# Patient Record
Sex: MALE | Race: BLACK OR AFRICAN AMERICAN | NOT HISPANIC OR LATINO | Employment: OTHER | URBAN - METROPOLITAN AREA
[De-identification: names, ages, dates, MRNs, and addresses within clinical notes are randomized per-mention and may not be internally consistent; named-entity substitution may affect disease eponyms.]

---

## 2022-11-08 ENCOUNTER — TELEPHONE (OUTPATIENT)
Dept: SURGERY | Facility: CLINIC | Age: 57
End: 2022-11-08

## 2022-11-08 ENCOUNTER — PREP FOR SURGERY (OUTPATIENT)
Dept: OTHER | Facility: HOSPITAL | Age: 57
End: 2022-11-08

## 2022-11-08 ENCOUNTER — OFFICE VISIT (OUTPATIENT)
Dept: SURGERY | Facility: CLINIC | Age: 57
End: 2022-11-08

## 2022-11-08 VITALS — HEIGHT: 71 IN | BODY MASS INDEX: 39.45 KG/M2 | WEIGHT: 281.8 LBS | RESPIRATION RATE: 14 BRPM

## 2022-11-08 DIAGNOSIS — K22.710 BARRETT'S ESOPHAGUS WITH LOW GRADE DYSPLASIA: Primary | ICD-10-CM

## 2022-11-08 PROCEDURE — 99203 OFFICE O/P NEW LOW 30 MIN: CPT | Performed by: SURGERY

## 2022-11-08 RX ORDER — FLUTICASONE PROPIONATE 50 MCG
SPRAY, SUSPENSION (ML) NASAL
COMMUNITY

## 2022-11-08 RX ORDER — PROPRANOLOL HYDROCHLORIDE 40 MG/1
TABLET ORAL
COMMUNITY

## 2022-11-08 RX ORDER — OMEPRAZOLE 20 MG/1
CAPSULE, DELAYED RELEASE ORAL
COMMUNITY

## 2022-11-08 RX ORDER — CHLORAL HYDRATE 500 MG
CAPSULE ORAL
COMMUNITY

## 2022-11-08 RX ORDER — ASPIRIN 81 MG/1
TABLET ORAL
COMMUNITY

## 2022-11-08 RX ORDER — BUPROPION HYDROCHLORIDE 150 MG/1
TABLET, EXTENDED RELEASE ORAL
COMMUNITY

## 2022-11-08 RX ORDER — CARBOXYMETHYLCELLULOSE SODIUM 5 MG/ML
SOLUTION/ DROPS OPHTHALMIC
COMMUNITY

## 2022-11-08 RX ORDER — PRAVASTATIN SODIUM 20 MG
TABLET ORAL
COMMUNITY

## 2022-11-08 RX ORDER — ANASTROZOLE 1 MG/1
TABLET ORAL
COMMUNITY

## 2022-11-08 RX ORDER — CYCLOBENZAPRINE HCL 10 MG
TABLET ORAL
COMMUNITY

## 2022-11-08 RX ORDER — MELOXICAM 15 MG/1
15 TABLET ORAL DAILY
COMMUNITY

## 2022-11-08 RX ORDER — TADALAFIL 5 MG/1
TABLET ORAL
COMMUNITY

## 2022-11-08 RX ORDER — CETIRIZINE HYDROCHLORIDE 10 MG/1
TABLET ORAL
COMMUNITY

## 2022-11-08 RX ORDER — FERROUS SULFATE 325(65) MG
TABLET ORAL
COMMUNITY

## 2022-11-08 RX ORDER — SILDENAFIL 100 MG/1
TABLET, FILM COATED ORAL
COMMUNITY

## 2022-11-08 RX ORDER — LISINOPRIL 20 MG/1
TABLET ORAL
COMMUNITY

## 2022-11-08 NOTE — TELEPHONE ENCOUNTER
Patient scheduled for EGD on 11/14/22.  He said to tell Dr. Ceja that he is on a bipap for sleep apnea.

## 2022-11-10 NOTE — H&P (VIEW-ONLY)
General Surgery/Colorectal Surgery Note    Patient Name:  Augustine Zaragoza  YOB: 1965  [id number]    Referring Provider: Heaven Carvalho APRN      Patient Care Team:  Provider, No Known as PCP - Uriah Sellers MD as Consulting Physician (General Surgery)    Chief complaint Barker's esophagus    Subjective .     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  He is currently taking a PPI.  His reflux symptoms are controlled.  No tobacco or significant alcohol use.  No blood thinner use.  Colonoscopy up-to-date.  Comes in to discuss surveillance upper endoscopy.  Denies any treatment for his low-grade dysplasia      History:  History reviewed. No pertinent past medical history.    History reviewed. No pertinent surgical history.    History reviewed. No pertinent family history.    Social History     Tobacco Use   • Smoking status: Never   • Smokeless tobacco: Never   Vaping Use   • Vaping Use: Never used       Review of Systems  All systems were reviewed and negative except for:   Review of Systems   Constitutional: Negative for chills, fever and unexpected weight loss.   HENT: Negative for congestion, nosebleeds and voice change.    Eyes: Negative for blurred vision, double vision and discharge.   Respiratory: Negative for apnea, chest tightness and shortness of breath.    Cardiovascular: Negative for chest pain and leg swelling.   Gastrointestinal:        See HPI   Endocrine: Negative for cold intolerance and heat intolerance.   Genitourinary: Negative for dysuria, hematuria and urgency.   Musculoskeletal: Negative for back pain, joint swelling and neck pain.   Skin: Negative for color change and dry skin.   Neurological: Negative for dizziness and confusion.   Hematological: Negative for adenopathy.   Psychiatric/Behavioral: Negative for agitation and behavioral problems.     MEDS:  Prior to Admission medications    Medication Sig  Start Date End Date Taking? Authorizing Provider   anastrozole (ARIMIDEX) 1 MG tablet anastrozole 1 mg tablet   Yes Provider, MD Sandra   ascorbic acid (VITAMIN C) 1000 MG tablet Daily.   Yes Provider, MD Sandra   aspirin 81 MG EC tablet aspirin 81 mg tablet,delayed release   Yes Provider, MD Sandra   buPROPion SR (WELLBUTRIN SR) 150 MG 12 hr tablet bupropion HCl  mg tablet,12 hr sustained-release   Yes Provider, MD Sandra   carboxymethylcellulose (REFRESH PLUS) 0.5 % solution Refresh Tears 0.5 % eye drops   Yes Provider, MD Sandra   cetirizine (zyrTEC) 10 MG tablet cetirizine 10 mg tablet   Yes Provider, Historical, MD   cyclobenzaprine (FLEXERIL) 10 MG tablet 1 tablet   Yes Provider, MD Sandra   Diclofenac Sodium (VOLTAREN) 1 % gel gel Voltaren 1 % topical gel   Yes Provider, Historical, MD   ferrous sulfate 325 (65 FE) MG tablet ferrous sulfate 325 mg (65 mg iron) tablet   Yes Provider, MD Sandra   fluticasone (FLONASE) 50 MCG/ACT nasal spray fluticasone propionate 50 mcg/actuation nasal spray,suspension   Yes Provider, Historical, MD   lisinopril (PRINIVIL,ZESTRIL) 20 MG tablet lisinopril 20 mg tablet   Yes Provider, Historical, MD   meloxicam (MOBIC) 15 MG tablet Take 1 tablet by mouth Daily.   Yes Provider, MD Sandra   Omega-3 Fatty Acids (fish oil) 1000 MG capsule capsule Fish Oil 340 mg-1,000 mg capsule   Yes Provider, Historical, MD   omeprazole (priLOSEC) 20 MG capsule omeprazole 20 mg capsule,delayed release   Yes Provider, MD Sandra   pravastatin (PRAVACHOL) 20 MG tablet pravastatin 20 mg tablet   Yes Provider, Historical, MD   propranolol (INDERAL) 40 MG tablet propranolol 40 mg tablet   Yes Provider, Historical, MD   sildenafil (VIAGRA) 100 MG tablet sildenafil 100 mg tablet   Yes Provider, Historical, MD   tadalafil (CIALIS) 5 MG tablet Cialis 5 mg tablet   Yes Provider, Historical, MD        Allergies:  Oxycodone-acetaminophen    Objective     Vital  "Signs        Physical Exam:     General Appearance:    Alert, cooperative, in no acute distress   Head:    Normocephalic, without obvious abnormality, atraumatic   Eyes:          Conjunctivae and sclerae normal, no icterus,     Ears:    Ears appear intact with no abnormalities noted   Throat:   No oral lesions, no thrush, oral mucosa moist   Neck:   No adenopathy, supple, trachea midline, no thyromegaly   Back:     No kyphosis present, no scoliosis present, no skin lesions,      erythema or scars, no tenderness to percussion or                   palpation,   range of motion normal   Lungs:     Clear to auscultation,respirations regular, even and                  unlabored    Heart:    Regular rhythm and normal rate, normal S1 and S2, no            murmur, no gallop, no rub, no click   Chest Wall:    No abnormalities observed   Abdomen:     Normal bowel sounds, no masses, no organomegaly, soft        non-tender, non-distended, no guarding, no rebound                tenderness   Rectal:        Extremities:   Moves all extremities well, no edema, no cyanosis, no             redness   Pulses:   Pulses palpable and equal bilaterally   Skin:   No bleeding, bruising or rash   Lymph nodes:   No palpable adenopathy   Neurologic:   A/o x 4 with no deficits       Results Review:   {Results Review:16089::\"I reviewed the patient's new clinical results.\"    LABS/IMAGING:  No results found for this or any previous visit.     Result Review :     Assessment & Plan     Barker's esophagus with low-grade dysplasia    I recommended proceeding with upper endoscopy with biopsies.  Benefits and alternatives discussed.  Risk procedure including bleeding and perforation discussed.  We will obtain his last EGD and pathology results from Dr. Cunningham in [facility].  If patient has low-grade dysplasia will recommend referral for radiofrequency ablation.  All questions answered.  He agrees with the plan.  Thank for the consult.      "         This document has been electronically signed by Uriah Ceja MD  November 10, 2022 08:53 EST

## 2022-11-10 NOTE — PROGRESS NOTES
General Surgery/Colorectal Surgery Note    Patient Name:  Augustine Zaragoza  YOB: 1965  [id number]    Referring Provider: Heaven Carvalho APRN      Patient Care Team:  Provider, No Known as PCP - Uriah Sellers MD as Consulting Physician (General Surgery)    Chief complaint Barker's esophagus    Subjective .     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  He is currently taking a PPI.  His reflux symptoms are controlled.  No tobacco or significant alcohol use.  No blood thinner use.  Colonoscopy up-to-date.  Comes in to discuss surveillance upper endoscopy.  Denies any treatment for his low-grade dysplasia      History:  History reviewed. No pertinent past medical history.    History reviewed. No pertinent surgical history.    History reviewed. No pertinent family history.    Social History     Tobacco Use   • Smoking status: Never   • Smokeless tobacco: Never   Vaping Use   • Vaping Use: Never used       Review of Systems  All systems were reviewed and negative except for:   Review of Systems   Constitutional: Negative for chills, fever and unexpected weight loss.   HENT: Negative for congestion, nosebleeds and voice change.    Eyes: Negative for blurred vision, double vision and discharge.   Respiratory: Negative for apnea, chest tightness and shortness of breath.    Cardiovascular: Negative for chest pain and leg swelling.   Gastrointestinal:        See HPI   Endocrine: Negative for cold intolerance and heat intolerance.   Genitourinary: Negative for dysuria, hematuria and urgency.   Musculoskeletal: Negative for back pain, joint swelling and neck pain.   Skin: Negative for color change and dry skin.   Neurological: Negative for dizziness and confusion.   Hematological: Negative for adenopathy.   Psychiatric/Behavioral: Negative for agitation and behavioral problems.     MEDS:  Prior to Admission medications    Medication Sig  Start Date End Date Taking? Authorizing Provider   anastrozole (ARIMIDEX) 1 MG tablet anastrozole 1 mg tablet   Yes Provider, MD Sandra   ascorbic acid (VITAMIN C) 1000 MG tablet Daily.   Yes Provider, MD Sandra   aspirin 81 MG EC tablet aspirin 81 mg tablet,delayed release   Yes Provider, MD Sandra   buPROPion SR (WELLBUTRIN SR) 150 MG 12 hr tablet bupropion HCl  mg tablet,12 hr sustained-release   Yes Provider, MD Sandar   carboxymethylcellulose (REFRESH PLUS) 0.5 % solution Refresh Tears 0.5 % eye drops   Yes Provider, MD Sandra   cetirizine (zyrTEC) 10 MG tablet cetirizine 10 mg tablet   Yes Provider, Historical, MD   cyclobenzaprine (FLEXERIL) 10 MG tablet 1 tablet   Yes Provider, MD Sandra   Diclofenac Sodium (VOLTAREN) 1 % gel gel Voltaren 1 % topical gel   Yes Provider, Historical, MD   ferrous sulfate 325 (65 FE) MG tablet ferrous sulfate 325 mg (65 mg iron) tablet   Yes Provider, MD Sandra   fluticasone (FLONASE) 50 MCG/ACT nasal spray fluticasone propionate 50 mcg/actuation nasal spray,suspension   Yes Provider, Historical, MD   lisinopril (PRINIVIL,ZESTRIL) 20 MG tablet lisinopril 20 mg tablet   Yes Provider, Historical, MD   meloxicam (MOBIC) 15 MG tablet Take 1 tablet by mouth Daily.   Yes Provider, MD Sandra   Omega-3 Fatty Acids (fish oil) 1000 MG capsule capsule Fish Oil 340 mg-1,000 mg capsule   Yes Provider, Historical, MD   omeprazole (priLOSEC) 20 MG capsule omeprazole 20 mg capsule,delayed release   Yes Provider, MD Sandra   pravastatin (PRAVACHOL) 20 MG tablet pravastatin 20 mg tablet   Yes Provider, Historical, MD   propranolol (INDERAL) 40 MG tablet propranolol 40 mg tablet   Yes Provider, Historical, MD   sildenafil (VIAGRA) 100 MG tablet sildenafil 100 mg tablet   Yes Provider, Historical, MD   tadalafil (CIALIS) 5 MG tablet Cialis 5 mg tablet   Yes Provider, Historical, MD        Allergies:  Oxycodone-acetaminophen    Objective     Vital  "Signs        Physical Exam:     General Appearance:    Alert, cooperative, in no acute distress   Head:    Normocephalic, without obvious abnormality, atraumatic   Eyes:          Conjunctivae and sclerae normal, no icterus,     Ears:    Ears appear intact with no abnormalities noted   Throat:   No oral lesions, no thrush, oral mucosa moist   Neck:   No adenopathy, supple, trachea midline, no thyromegaly   Back:     No kyphosis present, no scoliosis present, no skin lesions,      erythema or scars, no tenderness to percussion or                   palpation,   range of motion normal   Lungs:     Clear to auscultation,respirations regular, even and                  unlabored    Heart:    Regular rhythm and normal rate, normal S1 and S2, no            murmur, no gallop, no rub, no click   Chest Wall:    No abnormalities observed   Abdomen:     Normal bowel sounds, no masses, no organomegaly, soft        non-tender, non-distended, no guarding, no rebound                tenderness   Rectal:        Extremities:   Moves all extremities well, no edema, no cyanosis, no             redness   Pulses:   Pulses palpable and equal bilaterally   Skin:   No bleeding, bruising or rash   Lymph nodes:   No palpable adenopathy   Neurologic:   A/o x 4 with no deficits       Results Review:   {Results Review:82583::\"I reviewed the patient's new clinical results.\"    LABS/IMAGING:  No results found for this or any previous visit.     Result Review :     Assessment & Plan     Barker's esophagus with low-grade dysplasia    I recommended proceeding with upper endoscopy with biopsies.  Benefits and alternatives discussed.  Risk procedure including bleeding and perforation discussed.  We will obtain his last EGD and pathology results from Dr. Cunningham in [facility].  If patient has low-grade dysplasia will recommend referral for radiofrequency ablation.  All questions answered.  He agrees with the plan.  Thank for the consult.      "         This document has been electronically signed by Uriah Ceja MD  November 10, 2022 08:53 EST

## 2022-11-14 ENCOUNTER — HOSPITAL ENCOUNTER (OUTPATIENT)
Facility: HOSPITAL | Age: 57
Setting detail: HOSPITAL OUTPATIENT SURGERY
Discharge: HOME | End: 2022-11-14
Attending: SURGERY | Admitting: SURGERY

## 2022-11-14 ENCOUNTER — ANESTHESIA (OUTPATIENT)
Dept: GASTROENTEROLOGY | Facility: HOSPITAL | Age: 57
End: 2022-11-14

## 2022-11-14 ENCOUNTER — ANESTHESIA EVENT (OUTPATIENT)
Dept: GASTROENTEROLOGY | Facility: HOSPITAL | Age: 57
End: 2022-11-14

## 2022-11-14 VITALS
HEART RATE: 75 BPM | BODY MASS INDEX: 38.8 KG/M2 | WEIGHT: 278.22 LBS | DIASTOLIC BLOOD PRESSURE: 83 MMHG | SYSTOLIC BLOOD PRESSURE: 114 MMHG | TEMPERATURE: 97.5 F | OXYGEN SATURATION: 97 % | RESPIRATION RATE: 16 BRPM

## 2022-11-14 DIAGNOSIS — K22.710 BARRETT'S ESOPHAGUS WITH LOW GRADE DYSPLASIA: ICD-10-CM

## 2022-11-14 PROCEDURE — 25010000002 PROPOFOL 10 MG/ML EMULSION: Performed by: NURSE ANESTHETIST, CERTIFIED REGISTERED

## 2022-11-14 PROCEDURE — 88305 TISSUE EXAM BY PATHOLOGIST: CPT | Performed by: SURGERY

## 2022-11-14 PROCEDURE — 88342 IMHCHEM/IMCYTCHM 1ST ANTB: CPT | Performed by: SURGERY

## 2022-11-14 RX ORDER — LIDOCAINE HYDROCHLORIDE 20 MG/ML
INJECTION, SOLUTION EPIDURAL; INFILTRATION; INTRACAUDAL; PERINEURAL AS NEEDED
Status: DISCONTINUED | OUTPATIENT
Start: 2022-11-14 | End: 2022-11-14 | Stop reason: SURG

## 2022-11-14 RX ORDER — SODIUM CHLORIDE, SODIUM LACTATE, POTASSIUM CHLORIDE, CALCIUM CHLORIDE 600; 310; 30; 20 MG/100ML; MG/100ML; MG/100ML; MG/100ML
30 INJECTION, SOLUTION INTRAVENOUS CONTINUOUS
Status: DISCONTINUED | OUTPATIENT
Start: 2022-11-14 | End: 2022-11-14 | Stop reason: HOSPADM

## 2022-11-14 RX ORDER — PROPOFOL 10 MG/ML
VIAL (ML) INTRAVENOUS AS NEEDED
Status: DISCONTINUED | OUTPATIENT
Start: 2022-11-14 | End: 2022-11-14 | Stop reason: SURG

## 2022-11-14 RX ADMIN — PROPOFOL 20 MG: 10 INJECTION, EMULSION INTRAVENOUS at 13:55

## 2022-11-14 RX ADMIN — SODIUM CHLORIDE, POTASSIUM CHLORIDE, SODIUM LACTATE AND CALCIUM CHLORIDE 30 ML/HR: 600; 310; 30; 20 INJECTION, SOLUTION INTRAVENOUS at 12:21

## 2022-11-14 RX ADMIN — PROPOFOL 120 MG: 10 INJECTION, EMULSION INTRAVENOUS at 13:49

## 2022-11-14 RX ADMIN — PROPOFOL 40 MG: 10 INJECTION, EMULSION INTRAVENOUS at 13:52

## 2022-11-14 RX ADMIN — LIDOCAINE HYDROCHLORIDE 100 MG: 20 INJECTION, SOLUTION EPIDURAL; INFILTRATION; INTRACAUDAL; PERINEURAL at 13:48

## 2022-11-14 NOTE — ANESTHESIA PREPROCEDURE EVALUATION
Anesthesia Evaluation     Patient summary reviewed and Nursing notes reviewed   no history of anesthetic complications:  NPO Solid Status: > 8 hours  NPO Liquid Status: > 2 hours           Airway   Mallampati: I  TM distance: >3 FB  Neck ROM: limited  No difficulty expected  Dental      Pulmonary - normal exam    breath sounds clear to auscultation  (+) sleep apnea,   Cardiovascular - normal exam  Exercise tolerance: good (4-7 METS)    Rhythm: regular  Rate: normal    (+) hypertension, hyperlipidemia,       Neuro/Psych- negative ROS  GI/Hepatic/Renal/Endo - negative ROS     Musculoskeletal     Abdominal    Substance History - negative use     OB/GYN negative ob/gyn ROS         Other   arthritis,      ROS/Med Hx Other: PAT Nursing Notes unavailable.       Phys Exam Other: Pt has had 3 neck surgeries some movement but somewhat limited                Anesthesia Plan    ASA 3     general     (Total IV Anesthesia    Patient understands anesthesia not responsible for dental damage.  )  intravenous induction     Anesthetic plan, risks, benefits, and alternatives have been provided, discussed and informed consent has been obtained with: patient.    Plan discussed with CRNA.        CODE STATUS:

## 2022-11-14 NOTE — ANESTHESIA POSTPROCEDURE EVALUATION
Patient: Augustine Zaragoza    Procedure Summary     Date: 11/14/22 Room / Location: [facility] END[facility]COPY 1 / [facility]    Anesthesia Start: 1345 Anesthesia Stop: 1401    Procedure: ESOPHAGOGASTRODUODENOSCOPY WITH BX Diagnosis:       Barker's esophagus with low grade dysplasia      (Barker's esophagus with low grade dysplasia [K22.710])    Surgeons: Uriah Ceja MD Provider: Wil Quintana MD    Anesthesia Type: general ASA Status: 3          Anesthesia Type: general    Vitals  Vitals Value Taken Time   /77 11/14/22 1406   Temp 36.6 °C (97.8 °F) 11/14/22 1400   Pulse 81 11/14/22 1408   Resp 15 11/14/22 1400   SpO2 94 % 11/14/22 1408   Vitals shown include unvalidated device data.        Post Anesthesia Care and Evaluation    Patient location during evaluation: bedside  Patient participation: complete - patient participated  Level of consciousness: awake  Pain management: adequate    Airway patency: patent  Anesthetic complications: No anesthetic complications  PONV Status: none  Cardiovascular status: acceptable and stable  Respiratory status: acceptable  Hydration status: acceptable    Comments: An Anesthesiologist personally participated in the most demanding procedures (including induction and emergence if applicable) in the anesthesia plan, monitored the course of anesthesia administration at frequent intervals and remained physically present and available for immediate diagnosis and treatment of emergencies.

## 2022-11-16 LAB
CYTO UR: NORMAL
LAB AP CASE REPORT: NORMAL
LAB AP CLINICAL INFORMATION: NORMAL
LAB AP SPECIAL STAINS: NORMAL
PATH REPORT.FINAL DX SPEC: NORMAL
PATH REPORT.GROSS SPEC: NORMAL

## 2022-11-22 ENCOUNTER — OFFICE VISIT (OUTPATIENT)
Dept: SURGERY | Facility: CLINIC | Age: 57
End: 2022-11-22

## 2022-11-22 VITALS — WEIGHT: 284 LBS | BODY MASS INDEX: 39.76 KG/M2 | RESPIRATION RATE: 16 BRPM | HEIGHT: 71 IN

## 2022-11-22 DIAGNOSIS — K22.710 BARRETT'S ESOPHAGUS WITH LOW GRADE DYSPLASIA: Primary | ICD-10-CM

## 2022-11-22 PROCEDURE — 99212 OFFICE O/P EST SF 10 MIN: CPT | Performed by: SURGERY

## 2022-11-28 NOTE — PROGRESS NOTES
General Surgery/Colorectal Surgery Note    Patient Name:  Augustine Zaragoza  YOB: 1965  [id number]    Referring Provider: No ref. provider found      Patient Care Team:  Provider, No Known as PCP - Uriah Sellers MD as Consulting Physician (General Surgery)    Chief complaint follow-up    Subjective .     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  He is currently taking a PPI.  His reflux symptoms are controlled.  No tobacco or significant alcohol use.  No blood thinner use.  Colonoscopy up-to-date.  Comes in to discuss surveillance upper endoscopy.  Denies any treatment for his low-grade dysplasia    Records obtained from outside physician with biopsies demonstrating no evidence of Barker's on last upper endoscopy    Status post EGD 11/14/2020 with no ulcer, mass, hiatal hernia, esophagitis.  No obvious Barker's.  Multiple biopsies of the GE junction and lower esophagus negative for metaplasia or esophagitis.  Antral biopsy negative for H. pylori      History:  Past Medical History:   Diagnosis Date   • Allergies    • Anesthesia complication     ASPIRARATED - 1990'S WHEN COMING OUT OF PROCEDURE.   • Arthritis    • Barker's esophagus    • BPH (benign prostatic hyperplasia)    • Depression    • Essential tremor    • Hyperlipidemia    • Hypertension    • Sleep apnea        Past Surgical History:   Procedure Laterality Date   • BACK SURGERY     • CUBITAL TUNNEL RELEASE     • ENDOSCOPY N/A 11/14/2022    Procedure: ESOPHAGOGASTRODUODENOSCOPY WITH BX;  Surgeon: Uriah Ceja MD;  Location: [facility] ENDOSCOPY;  Service: General;  Laterality: N/A;  HISTORY OF BARRETTS WITH LOW GRADE DYSPLASIA   • KNEE ACL RECONSTRUCTION Bilateral    • KNEE ARTHROSCOPY      X3 RIGHT AND X3 LEFT   • NECK SURGERY      x3   • ULNAR NERVE REPAIR         Family History   Problem Relation Age of Onset   • Malig Hyperthermia Neg Hx        Social History      Tobacco Use   • Smoking status: Never   • Smokeless tobacco: Never   Vaping Use   • Vaping Use: Never used   Substance Use Topics   • Alcohol use: Yes     Comment: Sundays   • Drug use: Never       Review of Systems  All systems were reviewed and negative except for:   Review of Systems   Constitutional: Negative for chills, fever and unexpected weight loss.   HENT: Negative for congestion, nosebleeds and voice change.    Eyes: Negative for blurred vision, double vision and discharge.   Respiratory: Negative for apnea, chest tightness and shortness of breath.    Cardiovascular: Negative for chest pain and leg swelling.   Gastrointestinal:        See HPI   Endocrine: Negative for cold intolerance and heat intolerance.   Genitourinary: Negative for dysuria, hematuria and urgency.   Musculoskeletal: Negative for back pain, joint swelling and neck pain.   Skin: Negative for color change and dry skin.   Neurological: Negative for dizziness and confusion.   Hematological: Negative for adenopathy.   Psychiatric/Behavioral: Negative for agitation and behavioral problems.     MEDS:  Prior to Admission medications    Medication Sig Start Date End Date Taking? Authorizing Provider   anastrozole (ARIMIDEX) 1 MG tablet anastrozole 1 mg tablet   Yes Provider, MD Sandra   ascorbic acid (VITAMIN C) 1000 MG tablet Daily.   Yes Provider, Historical, MD   aspirin 81 MG EC tablet aspirin 81 mg tablet,delayed release   Yes Provider, MD Sandra   buPROPion SR (WELLBUTRIN SR) 150 MG 12 hr tablet bupropion HCl  mg tablet,12 hr sustained-release   Yes Provider, MD Sandra   carboxymethylcellulose (REFRESH PLUS) 0.5 % solution Refresh Tears 0.5 % eye drops   Yes Provider, MD Sandra   cetirizine (zyrTEC) 10 MG tablet cetirizine 10 mg tablet   Yes Provider, MD Sandra   cyclobenzaprine (FLEXERIL) 10 MG tablet 1 tablet   Yes Provider, Historical, MD   Diclofenac Sodium (VOLTAREN) 1 % gel gel Voltaren 1 % topical  gel   Yes Sandra Okeefe MD   ferrous sulfate 325 (65 FE) MG tablet ferrous sulfate 325 mg (65 mg iron) tablet   Yes Sandra Okeefe MD   fluticasone (FLONASE) 50 MCG/ACT nasal spray fluticasone propionate 50 mcg/actuation nasal spray,suspension   Yes Sandra Okeefe MD   lisinopril (PRINIVIL,ZESTRIL) 20 MG tablet lisinopril 20 mg tablet   Yes Sandra Okeefe MD   meloxicam (MOBIC) 15 MG tablet Take 1 tablet by mouth Daily.   Yes Sandra Okeefe MD   Omega-3 Fatty Acids (fish oil) 1000 MG capsule capsule Fish Oil 340 mg-1,000 mg capsule   Yes Sandra Okeefe MD   omeprazole (priLOSEC) 20 MG capsule omeprazole 20 mg capsule,delayed release   Yes Sandra Okeefe MD   pravastatin (PRAVACHOL) 20 MG tablet pravastatin 20 mg tablet   Yes ProviderSandra MD   propranolol (INDERAL) 40 MG tablet propranolol 40 mg tablet   Yes Sandra Okeefe MD   sildenafil (VIAGRA) 100 MG tablet sildenafil 100 mg tablet   Yes ProviderSandra MD   tadalafil (CIALIS) 5 MG tablet Cialis 5 mg tablet   Yes ProviderSandra MD        Allergies:  Oxycodone-acetaminophen    Objective     Vital Signs        Physical Exam:     General Appearance:    Alert, cooperative, in no acute distress   Head:    Normocephalic, without obvious abnormality, atraumatic   Eyes:          Conjunctivae and sclerae normal, no icterus,     Ears:    Ears appear intact with no abnormalities noted   Throat:   No oral lesions, no thrush, oral mucosa moist   Neck:   No adenopathy, supple, trachea midline, no thyromegaly   Back:     No kyphosis present, no scoliosis present, no skin lesions,      erythema or scars, no tenderness to percussion or                   palpation,   range of motion normal   Lungs:     Clear to auscultation,respirations regular, even and                  unlabored    Heart:    Regular rhythm and normal rate, normal S1 and S2, no            murmur, no gallop, no rub, no click   Chest  "Wall:    No abnormalities observed   Abdomen:     Normal bowel sounds, no masses, no organomegaly, soft        non-tender, non-distended, no guarding, no rebound                tenderness   Rectal:        Extremities:   Moves all extremities well, no edema, no cyanosis, no             redness   Pulses:   Pulses palpable and equal bilaterally   Skin:   No bleeding, bruising or rash   Lymph nodes:   No palpable adenopathy   Neurologic:   A/o x 4 with no deficits       Results Review:   {Results Review:[id number]::\"I reviewed the patient's new clinical results.\"    LABS/IMAGING:  Results for orders placed or performed during the hospital encounter of 11/14/22   Tissue Pathology Exam    Specimen: A: Gastric, Antrum; Tissue    B: GE Junction; Tissue    C: Esophagus; Tissue   Result Value Ref Range    Case Report       Surgical Pathology Report                         Case: [id number]                                  Authorizing Provider:  Uriah Ceja MD  Collected:           11/14/2022 01:51 PM          Ordering Location:     [facility] Received:            11/15/2022 08:48 AM                                 SUITES                                                                       Pathologist:           Caitlyn Louis DO                                                       Specimens:   1) - Gastric, Antrum, ANTRUM BX                                                                     2) - GE Junction, GE JUNCTION BX                                                                    3) - Esophagus, LOWER ESOPHAGUS BX                                                         Clinical Information       Barker's esophagus with low grade dysplasia      Final Diagnosis       1. Stomach, antrum, biopsy:    - Gastric antrum mucosa with chronic inactive gastritis    - Negative for Helicobacter pylori on immunohistochemical stain    - Negative for intestinal metaplasia, dysplasia and " "malignancy      2. Gastroesophageal junction, biopsy:    - Squamocolumnar mucosa with mild reactive epithelial changes    - Negative for intestinal metaplasia, dysplasia and malignancy      3. Lower esophagus, biopsy:    - Squamous mucosa with no significant pathologic change    - Negative for intestinal metaplasia, dysplasia and malignancy          Gross Description       1. Gastric, Antrum.  Received in formalin and labeled \" antrum\" is a 0.7 cm fragment of tan soft tissue. The specimen is entirely submitted in one cassette.    2. GE Junction.  Received in formalin and labeled \" GE junction\" is a 0.6 cm aggregate of tan soft tissue fragments. The specimen is entirely submitted in one cassette.    3. Esophagus.  Received in formalin and labeled \" lower esophagus\" is a 0.5 cm fragment of tan soft tissue. The specimen is entirely submitted in one cassette.   ALLEN      Special Stains       An immunoperoxidase stain for Helicobacter pylori was performed to aid in its detection since it was not readily identified on H&E stain.  All immunohistochemical/cytochemical stains (IHC) are performed on separate slides per different antibody unless otherwise specified in the documentation that a cocktail (multiple stain) was performed.  Controls are appropriate.        Microscopic Description          Result Review :     Assessment & Plan     History of Barker's with low-grade dysplasia  Status post EGD 11/14/2020 with no ulcer, mass, hiatal hernia, esophagitis.  No obvious Barker's.  Multiple biopsies of the GE junction and lower esophagus negative for metaplasia or esophagitis.  Antral biopsy negative for H. pylori    I reviewed the upper endoscopy and pathology findings with the patient.  Repeat EGD in 1 year.  Continue PPI.  All questions answered.  He agrees with the plan.  Thank for the consult.              This document has been electronically signed by Uriah Ceja MD  November 28, 2022 08:34 EST  "

## 2022-12-02 ENCOUNTER — TELEPHONE (OUTPATIENT)
Dept: SURGERY | Facility: CLINIC | Age: 57
End: 2022-12-02

## 2022-12-02 NOTE — TELEPHONE ENCOUNTER
Caller: APRIL    Relationship: [facility]    Best call back number: [phone]    What form or medical record are you requesting: OFFICE VISIT NOTES FROM POST OP VISIT 11/22 WITH DR. OLIVA    Who is requesting this form or medical record [facility] you: [facility]    How would you like to receive the form or medical records (pick-up, mail, fax): FAX  If fax, what is the fax number: [phone]    Timeframe paperwork needed: ASAP

## 2023-10-16 ENCOUNTER — TELEPHONE (OUTPATIENT)
Dept: SURGERY | Facility: CLINIC | Age: 58
End: 2023-10-16

## 2023-10-18 ENCOUNTER — OFFICE VISIT (OUTPATIENT)
Dept: SURGERY | Facility: CLINIC | Age: 58
End: 2023-10-18
Payer: MEDICARE

## 2023-10-18 ENCOUNTER — PREP FOR SURGERY (OUTPATIENT)
Dept: OTHER | Facility: HOSPITAL | Age: 58
End: 2023-10-18
Payer: MEDICARE

## 2023-10-18 VITALS
DIASTOLIC BLOOD PRESSURE: 87 MMHG | BODY MASS INDEX: 38.91 KG/M2 | SYSTOLIC BLOOD PRESSURE: 141 MMHG | WEIGHT: 279 LBS | HEART RATE: 82 BPM

## 2023-10-18 DIAGNOSIS — K22.70 BARRETT'S ESOPHAGUS: Primary | ICD-10-CM

## 2023-10-18 DIAGNOSIS — K22.70 BARRETT'S ESOPHAGUS WITHOUT DYSPLASIA: Primary | ICD-10-CM

## 2023-10-18 RX ORDER — SODIUM CHLORIDE 0.9 % (FLUSH) 0.9 %
3 SYRINGE (ML) INJECTION EVERY 12 HOURS SCHEDULED
OUTPATIENT
Start: 2023-10-18

## 2023-10-18 RX ORDER — SODIUM CHLORIDE 0.9 % (FLUSH) 0.9 %
10 SYRINGE (ML) INJECTION AS NEEDED
OUTPATIENT
Start: 2023-10-18

## 2023-10-18 RX ORDER — ASPIRIN 81 MG/1
TABLET ORAL EVERY 24 HOURS
COMMUNITY

## 2023-10-18 RX ORDER — SODIUM CHLORIDE 9 MG/ML
40 INJECTION, SOLUTION INTRAVENOUS AS NEEDED
OUTPATIENT
Start: 2023-10-18

## 2023-10-18 NOTE — PROGRESS NOTES
Chief Complaint: Endoscopy Consult    Subjective      EGD consultation         History of Present Illness  Augustine Zaragoza is a 58 y.o. male presents to [facility] for EGD consultation.    Patient presents today for an EGD consultation.  Patient presents with a history of Barker's esophagus.  Patient does have GERD controlled with omeprazole.  Denies any epigastric pain.  Denies any melena.  Denies any family history of esophageal or stomach cancer.    11/22: EGD ([facility]): chronic gastritis; negative for intestinal metaplasia, dysplasia or malignancy.  Objective     Past Medical History:   Diagnosis Date    Allergies     Anesthesia complication     ASPIRARATED - 1990'S WHEN COMING OUT OF PROCEDURE.    Arthritis     Barker's esophagus     BPH (benign prostatic hyperplasia)     Depression     Essential tremor     Hyperlipidemia     Hypertension     Sleep apnea        Past Surgical History:   Procedure Laterality Date    BACK SURGERY      CUBITAL TUNNEL RELEASE      ENDOSCOPY N/A 11/14/2022    Procedure: ESOPHAGOGASTRODUODENOSCOPY WITH BX;  Surgeon: Uriah Ceja MD;  Location: [facility] ENDOSCOPY;  Service: General;  Laterality: N/A;  HISTORY OF BARRETTS WITH LOW GRADE DYSPLASIA    KNEE ACL RECONSTRUCTION Bilateral     KNEE ARTHROSCOPY      X3 RIGHT AND X3 LEFT    NECK SURGERY      x3    ULNAR NERVE REPAIR         Outpatient Medications Marked as Taking for the 10/18/23 encounter (Office Visit) with Tata Soto, APRGRACE   Medication Sig Dispense Refill    ascorbic acid (VITAMIN C) 1000 MG tablet Daily.      aspirin 81 MG EC tablet aspirin 81 mg tablet,delayed release      aspirin 81 MG EC tablet Daily.      buPROPion SR (WELLBUTRIN SR) 150 MG 12 hr tablet bupropion HCl  mg tablet,12 hr sustained-release      carboxymethylcellulose (REFRESH PLUS) 0.5 % solution Refresh Tears 0.5 % eye drops      cetirizine (zyrTEC) 10 MG tablet cetirizine 10 mg tablet      cyclobenzaprine  (FLEXERIL) 10 MG tablet 1 tablet      Diclofenac Sodium (VOLTAREN) 1 % gel gel Voltaren 1 % topical gel      ferrous sulfate 325 (65 FE) MG tablet ferrous sulfate 325 mg (65 mg iron) tablet      fluticasone (FLONASE) 50 MCG/ACT nasal spray fluticasone propionate 50 mcg/actuation nasal spray,suspension      lisinopril (PRINIVIL,ZESTRIL) 20 MG tablet lisinopril 20 mg tablet      meloxicam (MOBIC) 15 MG tablet Take 1 tablet by mouth Daily.      Omega-3 Fatty Acids (fish oil) 1000 MG capsule capsule Fish Oil 340 mg-1,000 mg capsule      omeprazole (priLOSEC) 20 MG capsule omeprazole 20 mg capsule,delayed release      pravastatin (PRAVACHOL) 20 MG tablet pravastatin 20 mg tablet      propranolol (INDERAL) 40 MG tablet propranolol 40 mg tablet      sildenafil (VIAGRA) 100 MG tablet sildenafil 100 mg tablet      tadalafil (CIALIS) 5 MG tablet Cialis 5 mg tablet         Allergies   Allergen Reactions    Oxycodone-Acetaminophen Swelling        Family History   Problem Relation Age of Onset    Malig Hyperthermia Neg Hx        Social History     Socioeconomic History    Marital status:    Tobacco Use    Smoking status: Never    Smokeless tobacco: Never   Vaping Use    Vaping Use: Never used   Substance and Sexual Activity    Alcohol use: Yes     Comment: Sundays    Drug use: Never    Sexual activity: Defer       Review of Systems   Constitutional:  Negative for chills and fever.   HENT:  Negative for trouble swallowing.    Gastrointestinal:  Positive for GERD. Negative for abdominal distention, abdominal pain, nausea, vomiting and indigestion.        Vital Signs:   /87   Pulse 82   Wt 127 kg (279 lb)   BMI 38.91 kg/m²      Physical Exam  Vitals and nursing note reviewed.   Constitutional:       General: He is not in acute distress.     Appearance: Normal appearance.   HENT:      Head: Normocephalic.   Cardiovascular:      Rate and Rhythm: Normal rate.   Pulmonary:      Effort: Pulmonary effort is normal.    Abdominal:      Palpations: Abdomen is soft.      Tenderness: There is no guarding.   Musculoskeletal:         General: No deformity. Normal range of motion.   Skin:     General: Skin is warm and dry.      Findings: No bruising.   Neurological:      General: No focal deficit present.      Mental Status: He is alert and oriented to person, place, and time.   Psychiatric:         Mood and Affect: Mood normal.         Thought Content: Thought content normal.          Result Review :          []  Laboratory  []  Radiology  []  Pathology  []  Microbiology  []  EKG/Telemetry   []  Cardiology/Vascular   []  Old records  I spent 15 minutes caring for Augustine on this date of service. This time includes time spent by me in the following activities: reviewing tests, obtaining and/or reviewing a separately obtained history, performing a medically appropriate examination and/or evaluation, ordering medications, tests, or procedures, and documenting information in the medical record.       Assessment and Plan    Diagnoses and all orders for this visit:    1. Barker's esophagus without dysplasia (Primary)        Follow Up   Return for Schedule EGD with Dr. Ceja on 11/30/2023 at [facility].    Hospital arrival time: 11:00 am    Possible risks/complications, benefits, and alternatives to surgical or invasive procedure have been explained to patient and/or legal guardian.     Patient has been evaluated and can tolerate anesthesia and/or sedation. Risks, benefits, and alternatives to anesthesia and sedation have been explained to patient and/or legal guardian.  Patient verbalizes understanding and is willing to proceed with above plan.    Patient was given instructions and counseling regarding his condition or for health maintenance advice. Please see specific information pulled into the AVS if appropriate.

## 2023-10-19 ENCOUNTER — TELEPHONE (OUTPATIENT)
Dept: SURGERY | Facility: CLINIC | Age: 58
End: 2023-10-19
Payer: MEDICARE

## 2023-10-19 NOTE — TELEPHONE ENCOUNTER
Caller: BYRON    [facility]     Best call back number:     What form or medical record are you requesting: PROG NOTES FROM 10/18/23    Who is requesting this form or medical record from you: [facility]     How would you like to receive the form or medical records (pick-up, mail, fax): FAX  If fax, what is the fax number: [phone]    Timeframe paperwork needed: ASAP    Additional notes: N/A

## 2023-11-21 NOTE — PRE-PROCEDURE INSTRUCTIONS
Arrival time of 1100.    Must have a  for transportation home post procedure.    Nothing to eat or drink after midnight.     Hold all medications the morning of procedure. Bring all prescribed medications and inhalers to the hospital.     Instructed to take nebulizer treatment prior to coming to hospital on day of procedure.     Patient verbalized understanding of instructions.

## 2023-11-29 ENCOUNTER — ANESTHESIA EVENT (OUTPATIENT)
Dept: GASTROENTEROLOGY | Facility: HOSPITAL | Age: 58
End: 2023-11-29
Payer: OTHER GOVERNMENT

## 2023-11-29 NOTE — ANESTHESIA PREPROCEDURE EVALUATION
Anesthesia Evaluation     NPO Solid Status: > 8 hours  NPO Liquid Status: < 2 hours           Airway   Mallampati: II  TM distance: >3 FB  Neck ROM: full  No difficulty expected  Dental - normal exam     Pulmonary - normal exam   (+) ,sleep apnea  Cardiovascular - normal exam    Patient on routine beta blocker    (+) hypertension 2 medications or greater, hyperlipidemia      Neuro/Psych  (+) psychiatric history  GI/Hepatic/Renal/Endo      Musculoskeletal     Abdominal  - normal exam    Bowel sounds: normal.   Substance History      OB/GYN          Other   arthritis,                   Anesthesia Plan    ASA 3     general   total IV anesthesia  intravenous induction     Anesthetic plan, risks, benefits, and alternatives have been provided, discussed and informed consent has been obtained with: patient.  Pre-procedure education provided  Plan discussed with CRNA.      CODE STATUS:

## 2023-11-30 ENCOUNTER — ANESTHESIA (OUTPATIENT)
Dept: GASTROENTEROLOGY | Facility: HOSPITAL | Age: 58
End: 2023-11-30
Payer: OTHER GOVERNMENT

## 2023-11-30 ENCOUNTER — HOSPITAL ENCOUNTER (OUTPATIENT)
Facility: HOSPITAL | Age: 58
Setting detail: HOSPITAL OUTPATIENT SURGERY
Discharge: HOME | End: 2023-11-30
Attending: SURGERY | Admitting: SURGERY
Payer: OTHER GOVERNMENT

## 2023-11-30 VITALS
OXYGEN SATURATION: 96 % | HEART RATE: 95 BPM | DIASTOLIC BLOOD PRESSURE: 83 MMHG | WEIGHT: 283.51 LBS | RESPIRATION RATE: 15 BRPM | BODY MASS INDEX: 39.54 KG/M2 | TEMPERATURE: 97.6 F | SYSTOLIC BLOOD PRESSURE: 131 MMHG

## 2023-11-30 DIAGNOSIS — K22.70 BARRETT'S ESOPHAGUS: ICD-10-CM

## 2023-11-30 PROCEDURE — 88305 TISSUE EXAM BY PATHOLOGIST: CPT | Performed by: SURGERY

## 2023-11-30 PROCEDURE — 25010000002 PROPOFOL 10 MG/ML EMULSION

## 2023-11-30 PROCEDURE — 25810000003 LACTATED RINGERS PER 1000 ML

## 2023-11-30 RX ORDER — SODIUM CHLORIDE 9 MG/ML
40 INJECTION, SOLUTION INTRAVENOUS AS NEEDED
Status: DISCONTINUED | OUTPATIENT
Start: 2023-11-30 | End: 2023-11-30 | Stop reason: HOSPADM

## 2023-11-30 RX ORDER — SODIUM CHLORIDE, SODIUM LACTATE, POTASSIUM CHLORIDE, CALCIUM CHLORIDE 600; 310; 30; 20 MG/100ML; MG/100ML; MG/100ML; MG/100ML
30 INJECTION, SOLUTION INTRAVENOUS CONTINUOUS
Status: DISCONTINUED | OUTPATIENT
Start: 2023-11-30 | End: 2023-11-30 | Stop reason: HOSPADM

## 2023-11-30 RX ORDER — SODIUM CHLORIDE 0.9 % (FLUSH) 0.9 %
3 SYRINGE (ML) INJECTION EVERY 12 HOURS SCHEDULED
Status: DISCONTINUED | OUTPATIENT
Start: 2023-11-30 | End: 2023-11-30 | Stop reason: HOSPADM

## 2023-11-30 RX ORDER — PROPOFOL 10 MG/ML
VIAL (ML) INTRAVENOUS AS NEEDED
Status: DISCONTINUED | OUTPATIENT
Start: 2023-11-30 | End: 2023-11-30 | Stop reason: SURG

## 2023-11-30 RX ORDER — SODIUM CHLORIDE 0.9 % (FLUSH) 0.9 %
10 SYRINGE (ML) INJECTION AS NEEDED
Status: DISCONTINUED | OUTPATIENT
Start: 2023-11-30 | End: 2023-11-30 | Stop reason: HOSPADM

## 2023-11-30 RX ORDER — LIDOCAINE HYDROCHLORIDE 20 MG/ML
INJECTION, SOLUTION EPIDURAL; INFILTRATION; INTRACAUDAL; PERINEURAL AS NEEDED
Status: DISCONTINUED | OUTPATIENT
Start: 2023-11-30 | End: 2023-11-30 | Stop reason: SURG

## 2023-11-30 RX ADMIN — PROPOFOL 100 MG: 10 INJECTION, EMULSION INTRAVENOUS at 09:08

## 2023-11-30 RX ADMIN — SODIUM CHLORIDE, POTASSIUM CHLORIDE, SODIUM LACTATE AND CALCIUM CHLORIDE: 600; 310; 30; 20 INJECTION, SOLUTION INTRAVENOUS at 09:06

## 2023-11-30 RX ADMIN — PROPOFOL 200 MCG/KG/MIN: 10 INJECTION, EMULSION INTRAVENOUS at 09:08

## 2023-11-30 RX ADMIN — LIDOCAINE HYDROCHLORIDE 60 MG: 20 INJECTION, SOLUTION EPIDURAL; INFILTRATION; INTRACAUDAL; PERINEURAL at 09:08

## 2023-11-30 NOTE — ANESTHESIA POSTPROCEDURE EVALUATION
Patient: Augustine Zaragoza    Procedure Summary       Date: 11/30/23 Room / Location: [facility] ENDOSCOPY 3 / [facility] ENDOSCOPY    Anesthesia Start: 0906 Anesthesia Stop: 0921    Procedure: ESOPHAGOGASTRODUODENOSCOPY WITH COLD BIOPSIES Diagnosis:       Barker's esophagus      (Barker's esophagus [K22.70])    Surgeons: Uriah Ceja MD Provider: Ronnell Boyd CRNA    Anesthesia Type: general ASA Status: 3            Anesthesia Type: general    Vitals  Vitals Value Taken Time   /80 11/30/23 0930   Temp 36.3 °C (97.3 °F) 11/30/23 0920   Pulse 97 11/30/23 0932   Resp 17 11/30/23 0925   SpO2 97 % 11/30/23 0932   Vitals shown include unfiled device data.        Post Anesthesia Care and Evaluation    Patient location during evaluation: PACU  Patient participation: complete - patient participated  Level of consciousness: awake  Pain scale: See nurse's notes for pain score.  Pain management: adequate    Airway patency: patent  Anesthetic complications: No anesthetic complications  PONV Status: none  Cardiovascular status: acceptable  Respiratory status: acceptable and spontaneous ventilation  Hydration status: acceptable    Comments: Patient seen and examined postoperatively; vital signs stable; SpO2 greater than or equal to 90%; cardiopulmonary status stable; nausea/vomiting adequately controlled; pain adequately controlled; no apparent anesthesia complications; patient discharged from anesthesia care when discharge criteria were met

## 2023-11-30 NOTE — H&P
General Surgery/Colorectal Surgery Note    Patient Name:  Augustine Zaragoza  YOB: 1965  [id number]    Referring Provider: Uriah Ceja, *      Patient Care Team:  Provider, No Known as PCP - General  Uriah Ceja MD as Consulting Physician (General Surgery)    Subjective .     History of present illness:    HPI   presents today for an EGD consultation.  Patient presents with a history of Barker's esophagus.  Patient does have GERD controlled with omeprazole.  Denies any epigastric pain.  Denies any melena.  Denies any family history of esophageal or stomach cancer.     11/22: EGD ([facility]): chronic gastritis; negative for intestinal metaplasia, dysplasia or malignancy.    History:  Past Medical History:   Diagnosis Date    Allergies     Anesthesia complication     ASPIRARATED - 1990'S WHEN COMING OUT OF PROCEDURE.    Arthritis     Barker's esophagus     BPH (benign prostatic hyperplasia)     Depression     Essential tremor     Hyperlipidemia     Hypertension     Sleep apnea        Past Surgical History:   Procedure Laterality Date    BACK SURGERY      CUBITAL TUNNEL RELEASE      ENDOSCOPY N/A 11/14/2022    Procedure: ESOPHAGOGASTRODUODENOSCOPY WITH BX;  Surgeon: Uriah Ceja MD;  Location: [facility] ENDOSCOPY;  Service: General;  Laterality: N/A;  HISTORY OF BARRETTS WITH LOW GRADE DYSPLASIA    KNEE ACL RECONSTRUCTION Bilateral     KNEE ARTHROSCOPY      X3 RIGHT AND X3 LEFT    NECK SURGERY      x3    ULNAR NERVE REPAIR         Family History   Problem Relation Age of Onset    Malig Hyperthermia Neg Hx        Social History     Tobacco Use    Smoking status: Never    Smokeless tobacco: Never   Vaping Use    Vaping Use: Never used   Substance Use Topics    Alcohol use: Yes     Comment: Sundays    Drug use: Never       Review of Systems: See HPI    Review of Systems            Medications Prior to Admission   Medication Sig Dispense Refill Last Dose    ascorbic acid (VITAMIN C) 1000  MG tablet Daily.   11/29/2023    buPROPion SR (WELLBUTRIN SR) 150 MG 12 hr tablet bupropion HCl  mg tablet,12 hr sustained-release   11/29/2023    cetirizine (zyrTEC) 10 MG tablet cetirizine 10 mg tablet   11/29/2023    cyclobenzaprine (FLEXERIL) 10 MG tablet 1 tablet   Past Month    ferrous sulfate 325 (65 FE) MG tablet ferrous sulfate 325 mg (65 mg iron) tablet   11/29/2023    fluticasone (FLONASE) 50 MCG/ACT nasal spray fluticasone propionate 50 mcg/actuation nasal spray,suspension   11/29/2023    lisinopril (PRINIVIL,ZESTRIL) 20 MG tablet lisinopril 20 mg tablet   11/29/2023    meloxicam (MOBIC) 15 MG tablet Take 1 tablet by mouth Daily.   11/29/2023    Omega-3 Fatty Acids (fish oil) 1000 MG capsule capsule Fish Oil 340 mg-1,000 mg capsule   11/29/2023    omeprazole (priLOSEC) 20 MG capsule omeprazole 20 mg capsule,delayed release   11/29/2023    pravastatin (PRAVACHOL) 20 MG tablet pravastatin 20 mg tablet   11/29/2023    propranolol (INDERAL) 40 MG tablet propranolol 40 mg tablet   11/29/2023    anastrozole (ARIMIDEX) 1 MG tablet anastrozole 1 mg tablet       aspirin 81 MG EC tablet aspirin 81 mg tablet,delayed release   11/28/2023    aspirin 81 MG EC tablet Daily.       carboxymethylcellulose (REFRESH PLUS) 0.5 % solution Refresh Tears 0.5 % eye drops       Diclofenac Sodium (VOLTAREN) 1 % gel gel Voltaren 1 % topical gel       sildenafil (VIAGRA) 100 MG tablet sildenafil 100 mg tablet       tadalafil (CIALIS) 5 MG tablet Cialis 5 mg tablet            Home Meds:      Prior to Admission medications    Medication Sig Start Date End Date Taking? Authorizing Provider   ascorbic acid (VITAMIN C) 1000 MG tablet Daily.   Yes ProviderSandra MD   buPROPion SR (WELLBUTRIN SR) 150 MG 12 hr tablet bupropion HCl  mg tablet,12 hr sustained-release   Yes Provider, MD Sandra   cetirizine (zyrTEC) 10 MG tablet cetirizine 10 mg tablet   Yes Provider, MD Sandra   cyclobenzaprine (FLEXERIL) 10 MG  tablet 1 tablet   Yes Sandra Okeefe MD   ferrous sulfate 325 (65 FE) MG tablet ferrous sulfate 325 mg (65 mg iron) tablet   Yes Sandra Okeefe MD   fluticasone (FLONASE) 50 MCG/ACT nasal spray fluticasone propionate 50 mcg/actuation nasal spray,suspension   Yes Sandra Okeefe MD   lisinopril (PRINIVIL,ZESTRIL) 20 MG tablet lisinopril 20 mg tablet   Yes Sandra Okeefe MD   meloxicam (MOBIC) 15 MG tablet Take 1 tablet by mouth Daily.   Yes Sandra Okeefe MD   Omega-3 Fatty Acids (fish oil) 1000 MG capsule capsule Fish Oil 340 mg-1,000 mg capsule   Yes Sandra Okeefe MD   omeprazole (priLOSEC) 20 MG capsule omeprazole 20 mg capsule,delayed release   Yes Sandra Okeefe MD   pravastatin (PRAVACHOL) 20 MG tablet pravastatin 20 mg tablet   Yes Sandra Okeefe MD   propranolol (INDERAL) 40 MG tablet propranolol 40 mg tablet   Yes Sandra Okeefe MD   anastrozole (ARIMIDEX) 1 MG tablet anastrozole 1 mg tablet    Sandra Okeefe MD   aspirin 81 MG EC tablet aspirin 81 mg tablet,delayed release    ProviderSandra MD   aspirin 81 MG EC tablet Daily.    Sandra Okeefe MD   carboxymethylcellulose (REFRESH PLUS) 0.5 % solution Refresh Tears 0.5 % eye drops    Sandra Okeefe MD   Diclofenac Sodium (VOLTAREN) 1 % gel gel Voltaren 1 % topical gel    Sandra Okeefe MD   sildenafil (VIAGRA) 100 MG tablet sildenafil 100 mg tablet    Sandra kOeefe MD   tadalafil (CIALIS) 5 MG tablet Cialis 5 mg tablet    ProviderSandra MD            Allergies:  Oxycodone-acetaminophen      Objective     Vital Signs   Temp:  [97.6 °F (36.4 °C)] 97.6 °F (36.4 °C)  Heart Rate:  [98] 98  Resp:  [17] 17    Physical Exam:     Physical Exam    NAD, A/O x 4, normal circulation, normal respiration      Result Review :     Assessment & Plan     Diagnoses and all orders for this visit:    1. Barker's esophagus  -     sodium chloride 0.9 % flush 3 mL  -      sodium chloride 0.9 % flush 10 mL  -     sodium chloride 0.9 % infusion 40 mL    Other orders  -     POC Glucose Once; Standing  -     Insert Peripheral IV; Standing  -     lactated ringers infusion  -     Follow Anesthesia Guidelines / Protocol; Standing  -     Insert Peripheral IV; Standing  -     Saline Lock & Maintain IV Access; Standing  -     Verify NPO Status; Standing  -     Obtain Informed Consent; Standing  -     POC Glucose Once  -     Insert Peripheral IV  -     Follow Anesthesia Guidelines / Protocol  -     Insert Peripheral IV  -     Saline Lock & Maintain IV Access  -     Verify NPO Status  -     Obtain Informed Consent           Risks including bleeding, perforation, pain, infection, missed polyp(s). Benefits, and alternatives of EGD discussed with patient.  All questions answered.  Consent verified.         Uriah Ceja MD  11/30/23 08:51 EST

## 2023-12-01 LAB
CYTO UR: NORMAL
LAB AP CASE REPORT: NORMAL
LAB AP CLINICAL INFORMATION: NORMAL
PATH REPORT.FINAL DX SPEC: NORMAL
PATH REPORT.GROSS SPEC: NORMAL

## 2023-12-21 ENCOUNTER — OFFICE VISIT (OUTPATIENT)
Dept: SURGERY | Facility: CLINIC | Age: 58
End: 2023-12-21
Payer: OTHER GOVERNMENT

## 2023-12-21 VITALS
WEIGHT: 283 LBS | SYSTOLIC BLOOD PRESSURE: 110 MMHG | HEART RATE: 89 BPM | HEIGHT: 71 IN | BODY MASS INDEX: 39.62 KG/M2 | DIASTOLIC BLOOD PRESSURE: 71 MMHG

## 2023-12-21 DIAGNOSIS — K29.30 CHRONIC SUPERFICIAL GASTRITIS WITHOUT BLEEDING: ICD-10-CM

## 2023-12-21 DIAGNOSIS — Z98.890 S/P ENDOSCOPY: Primary | ICD-10-CM

## 2023-12-21 PROCEDURE — 99212 OFFICE O/P EST SF 10 MIN: CPT | Performed by: NURSE PRACTITIONER

## 2023-12-21 NOTE — PROGRESS NOTES
Chief Complaint: Post-op Follow-up    Subjective      Follow-up visit       History of Present Illness  Augustine Zaragoza is a 58 y.o. male presents to [facility] GENERAL SURGERY for follow-up visit.    Patient presents today for follow-up visit after undergoing EGD on 11/30/2023 performed by Dr. Uriah Ceja.  Patient was with mild chronic inactive gastritis per EGD/pathology.  Pathology was negative for intestinal metaplasia, dysplasia or malignancy.      Clinical Information    Barker's esophagus   Final Diagnosis   1. Stomach, antrum, biopsy:               - Gastric body/fundus mucosa with mild chronic inactive gastritis               - Negative for Helicobacter pylori on routine H&E stain               - Negative for intestinal metaplasia, dysplasia and malignancy        2.  Gastroesophageal junction, biopsy:               -Squamocolumnar mucosa with no significant pathologic change               - Negative for intestinal metaplasia, dysplasia and malignancy   Electronically signed by Joe Zee MD   EGD was performed without difficulty.  Patient tolerated the procedure well.    Denies any postoperative complications.    Objective     Past Medical History:   Diagnosis Date    Allergies     Anesthesia complication     ASPIRARATED - 1990'S WHEN COMING OUT OF PROCEDURE.    Arthritis     Barker's esophagus     BPH (benign prostatic hyperplasia)     Depression     Essential tremor     Hyperlipidemia     Hypertension     Sleep apnea        Past Surgical History:   Procedure Laterality Date    BACK SURGERY      CUBITAL TUNNEL RELEASE      ENDOSCOPY N/A 11/14/2022    Procedure: ESOPHAGOGASTRODUODENOSCOPY WITH BX;  Surgeon: Uriah Ceja MD;  Location: [facility] ENDOSCOPY;  Service: General;  Laterality: N/A;  HISTORY OF BARRETTS WITH LOW GRADE DYSPLASIA    ENDOSCOPY N/A 11/30/2023    Procedure: ESOPHAGOGASTRODUODENOSCOPY WITH COLD BIOPSIES;  Surgeon: Uriah Ceja MD;  Location:   [facility] ENDOSCOPY;  Service: General;  Laterality: N/A;  JIMÉNEZ'S ESOPHAGUS    KNEE ACL RECONSTRUCTION Bilateral     KNEE ARTHROSCOPY      X3 RIGHT AND X3 LEFT    NECK SURGERY      x3    ULNAR NERVE REPAIR         Outpatient Medications Marked as Taking for the 12/21/23 encounter (Office Visit) with Tata Soto APRN   Medication Sig Dispense Refill    ascorbic acid (VITAMIN C) 1000 MG tablet Daily.      aspirin 81 MG EC tablet aspirin 81 mg tablet,delayed release      buPROPion SR (WELLBUTRIN SR) 150 MG 12 hr tablet bupropion HCl  mg tablet,12 hr sustained-release      carboxymethylcellulose (REFRESH PLUS) 0.5 % solution Refresh Tears 0.5 % eye drops      cetirizine (zyrTEC) 10 MG tablet cetirizine 10 mg tablet      cyclobenzaprine (FLEXERIL) 10 MG tablet 1 tablet      ferrous sulfate 325 (65 FE) MG tablet ferrous sulfate 325 mg (65 mg iron) tablet      fluticasone (FLONASE) 50 MCG/ACT nasal spray fluticasone propionate 50 mcg/actuation nasal spray,suspension      lisinopril (PRINIVIL,ZESTRIL) 20 MG tablet lisinopril 20 mg tablet      meloxicam (MOBIC) 15 MG tablet Take 1 tablet by mouth Daily.      Omega-3 Fatty Acids (fish oil) 1000 MG capsule capsule Fish Oil 340 mg-1,000 mg capsule      omeprazole (priLOSEC) 20 MG capsule omeprazole 20 mg capsule,delayed release      pravastatin (PRAVACHOL) 20 MG tablet pravastatin 20 mg tablet      propranolol (INDERAL) 40 MG tablet propranolol 40 mg tablet         Allergies   Allergen Reactions    Oxycodone-Acetaminophen Swelling        Family History   Problem Relation Age of Onset    Malig Hyperthermia Neg Hx        Social History     Socioeconomic History    Marital status:    Tobacco Use    Smoking status: Never    Smokeless tobacco: Never   Vaping Use    Vaping Use: Never used   Substance and Sexual Activity    Alcohol use: Yes     Comment: Sundays    Drug use: Never    Sexual activity: Defer       Review of Systems   Constitutional:  Negative for chills and  "fever.   HENT:  Negative for trouble swallowing.    Gastrointestinal:  Negative for abdominal distention, abdominal pain, diarrhea, nausea, vomiting, GERD and indigestion.        Vital Signs:   /71 (BP Location: Right arm)   Pulse 89   Ht 180.3 cm (71\")   Wt 128 kg (283 lb)   BMI 39.47 kg/m²      Physical Exam  Vitals and nursing note reviewed.   Constitutional:       General: He is not in acute distress.     Appearance: Normal appearance.   HENT:      Head: Normocephalic.   Cardiovascular:      Rate and Rhythm: Normal rate.   Pulmonary:      Effort: Pulmonary effort is normal.      Breath sounds: No stridor.   Abdominal:      Palpations: Abdomen is soft.      Tenderness: There is no guarding.   Musculoskeletal:         General: No deformity. Normal range of motion.   Skin:     General: Skin is warm and dry.      Findings: No bruising.   Neurological:      General: No focal deficit present.      Mental Status: He is alert and oriented to person, place, and time.   Psychiatric:         Mood and Affect: Mood normal.         Thought Content: Thought content normal.          Result Review :          []  Laboratory  []  Radiology  []  Pathology  []  Microbiology  []  EKG/Telemetry   []  Cardiology/Vascular   []  Old records  Today I reviewed Dr. Ceja's operative and pathology report     Assessment and Plan    Diagnoses and all orders for this visit:    1. S/P endoscopy (Primary)    2. Chronic superficial gastritis without bleeding        Follow Up   Return for Repeat ED in 2 years.    Take PPI or H2 blockers as prescribed  Avoid ASA and other NSAIDs such as ibuprofen  Avoid spicy foods and caffeine  Avoid smoking and excessive alcohol intake  Reduce stress/start stress reduction techniques      Patient was given instructions and counseling regarding his condition or for health maintenance advice. Please see specific information pulled into the AVS if appropriate.           "

## 2024-02-14 ENCOUNTER — TELEPHONE (OUTPATIENT)
Dept: SURGERY | Facility: CLINIC | Age: 59
End: 2024-02-14
Payer: MEDICARE

## 2024-04-15 ENCOUNTER — TELEPHONE (OUTPATIENT)
Dept: SURGERY | Facility: CLINIC | Age: 59
End: 2024-04-15
Payer: MEDICARE

## 2024-04-15 NOTE — TELEPHONE ENCOUNTER
PATIENT WALKED INTO CLINIC WANTING TO SCHEDULE W/ [facility] STATING [facility] TOLD [facility] ENLARGED [facility]MPH NODE. PT STATES HE NOW HAS MEDICARE AND  FOR LIFE.    INFORMED PATIENT WE WOULD NEED RECORDS AND A NEW REFERRAL W/ THAT DX.  PATIENT WAS NOT HAPPY WITH ANSWER.     PT HAS BEEN SEEN BY [facility] IN THE PAST FOR LEROY'S ESOPHAGUS. PLEASE ADVISE.    PLEASE ADVISE?

## 2024-04-17 ENCOUNTER — TELEPHONE (OUTPATIENT)
Dept: SURGERY | Facility: CLINIC | Age: 59
End: 2024-04-17
Payer: MEDICARE

## 2024-04-17 NOTE — TELEPHONE ENCOUNTER
PATIENT CALLED AND SAID HE HAS AN APPOINTMENT WITH DR. OLIVA ON 04/24/24 FOR A SECOND OPINION ON A LARGE LYMPH NODE.    HE HAD AN US DONE ON HIS THROAT ON [facility]/05/24 [facility] [place].      HE CALLED THEM TO GET A COPY OF THE US RESULTS AND TO HAVE THEM SENT TO DR. OLIVA.  THEY WILL NOT RELEASE THEM TO HIM.  THEY TOLD HIM THE PROVIDER HAS TO SEND A RELEASE OF INFORMATION REQUESTING IT TO THE RELEASE OF INFORMATION DEPARTMENT AT THE [facility].    IT WILL NEED TO HAVE HIS NAME, DATE OF BIRTH, AND LAST 4 OF HIS SS ON THE REQUEST.    FAX [phone].    PATIENT CALL BACK NUMER [phone]

## 2024-04-17 NOTE — TELEPHONE ENCOUNTER
I have faxed over request for medical records for the [facility] Throat from the [facility]. I informed patient I have sent the request to them as well.     Patient is scheduled for 04-24-24 with Dr. Ceja.

## 2024-04-19 NOTE — TELEPHONE ENCOUNTER
Spoke with [facility] and they stated they have sent this. I do not see it in the chart yet but I will check again on Monday.

## 2024-04-22 NOTE — TELEPHONE ENCOUNTER
STILL HAVE NOT RECEIVED RECORDS, [facility] STATES THEY SENT ON 04-17. THEY STATED THEY WILL SEND THEM AGAIN.

## 2024-04-24 ENCOUNTER — OFFICE VISIT (OUTPATIENT)
Dept: SURGERY | Facility: CLINIC | Age: 59
End: 2024-04-24
Payer: MEDICARE

## 2024-04-24 VITALS
WEIGHT: 286 LBS | SYSTOLIC BLOOD PRESSURE: 123 MMHG | DIASTOLIC BLOOD PRESSURE: 74 MMHG | HEIGHT: 71 IN | BODY MASS INDEX: 40.04 KG/M2 | HEART RATE: 84 BPM

## 2024-04-24 DIAGNOSIS — R22.1 NECK MASS: Primary | ICD-10-CM

## 2024-04-24 PROCEDURE — 1160F RVW MEDS BY RX/DR IN RCRD: CPT | Performed by: SURGERY

## 2024-04-24 PROCEDURE — 99213 OFFICE O/P EST LOW 20 MIN: CPT | Performed by: SURGERY

## 2024-04-24 PROCEDURE — 1159F MED LIST DOCD IN RCRD: CPT | Performed by: SURGERY

## 2024-04-24 RX ORDER — PROMETHAZINE HYDROCHLORIDE 25 MG/1
TABLET ORAL
COMMUNITY

## 2024-04-24 RX ORDER — FLUCONAZOLE 200 MG/1
TABLET ORAL
COMMUNITY

## 2024-04-24 RX ORDER — HYDROCODONE BITARTRATE AND ACETAMINOPHEN 5; 325 MG/1; MG/1
TABLET ORAL
COMMUNITY

## 2024-04-24 RX ORDER — BACLOFEN 10 MG/1
TABLET ORAL
COMMUNITY

## 2024-04-24 RX ORDER — TIZANIDINE 4 MG/1
TABLET ORAL
COMMUNITY

## 2024-04-24 RX ORDER — TESTOSTERONE 10 MG/.5G
GEL, METERED TOPICAL
COMMUNITY

## 2024-04-24 RX ORDER — LIDOCAINE 50 MG/G
OINTMENT TOPICAL
COMMUNITY

## 2024-04-24 RX ORDER — DIAZEPAM 10 MG/1
TABLET ORAL
COMMUNITY

## 2024-04-24 RX ORDER — SILDENAFIL 50 MG/1
TABLET, FILM COATED ORAL
COMMUNITY

## 2024-04-25 ENCOUNTER — TELEPHONE (OUTPATIENT)
Dept: SURGERY | Facility: CLINIC | Age: 59
End: 2024-04-25
Payer: MEDICARE

## 2024-04-25 DIAGNOSIS — R59.1 LYMPHADENOPATHY: Primary | ICD-10-CM

## 2024-04-25 NOTE — TELEPHONE ENCOUNTER
PATIENT CALLED AND SAID HE SAW DR. OLIVA YESTERDAY.      HE ASKED FOR HIS MA TO REQUEST HIS COLONOSCOPY RECORD FROM 04/22/24 FOR [facility] IN [place].  THEY WANT HIM TO COME GET IT, BUT WE CAN JUST FAX THE REQUEST.    HE SAID HE HAS TO REPEAT HIS UPPER GI AND COLONOSCOPY IN 2025 FOR APRIL.

## 2024-04-25 NOTE — PROGRESS NOTES
General Surgery/Colorectal Surgery Note    Patient Name:  Augustine Zaragoza  YOB: 1965  [id number]    Referring Provider: No ref. provider found      Patient Care Team:  Provider, No Known as PCP - Uriah Sellers MD as Consulting Physician (General Surgery)  Charles April, APRGRACE as Nurse Practitioner (Nurse Practitioner)    Chief complaint neck mass    Subjective .     History of present illness:    Previously seen for Jiménez's esophagus    He has noticed a small mass to his right anterior neck in February.  No history of the same.  No pain.  No infectious symptoms.  No fever chills night sweats.  No dysphagia.  No voice changes.  No painful swallowing.  No weight loss.  No changes in health or medications since last seen.  Ultrasound at outside facility of the neck with 4 mm hypoechoic right neck nodule questionable level 2 lymph node.  History of 3 neck surgeries with anterior exposure.      History:  Past Medical History:   Diagnosis Date    Allergies     Anemia 04/15/2015    I’m on Ferous Sulfate tablets 2x a day    Anesthesia complication     ASPIRARATED - 1990'S WHEN COMING OUT OF PROCEDURE.    Arthritis     Asthma 09/11/1998    Sleep Apnea / I sleep with a BIPAP machine    Jiménez's esophagus     BPH (benign prostatic hyperplasia)     Depression     Essential tremor     Hyperlipidemia     Hypertension     Sleep apnea        Past Surgical History:   Procedure Laterality Date    APPENDECTOMY  1999    BACK SURGERY      CUBITAL TUNNEL RELEASE      ENDOSCOPY N/A 11/14/2022    Procedure: ESOPHAGOGASTRODUODENOSCOPY WITH BX;  Surgeon: Uriah Ceja MD;  Location: [facility] ENDOSCOPY;  Service: General;  Laterality: N/A;  HISTORY OF BARRETTS WITH LOW GRADE DYSPLASIA    ENDOSCOPY N/A 11/30/2023    Procedure: ESOPHAGOGASTRODUODENOSCOPY WITH COLD BIOPSIES;  Surgeon: Uriah Ceja MD;  Location: [facility] ENDOSCOPY;  Service: General;  Laterality: N/A;  JIMÉNEZ'S ESOPHAGUS    KNEE ACL  RECONSTRUCTION Bilateral     KNEE ARTHROSCOPY      X3 RIGHT AND X3 LEFT    NECK SURGERY      x3    ULNAR NERVE REPAIR         Family History   Problem Relation Age of Onset    Cancer Father         Non Hodgkins Lymphoma    COPD Maternal Grandmother     Hypertension Maternal Grandmother     Arthritis Paternal Grandmother     Asthma Paternal Grandmother     Diabetes Paternal Grandmother     Malig Hyperthermia Neg Hx        Social History     Tobacco Use    Smoking status: Never    Smokeless tobacco: Never   Vaping Use    Vaping status: Never Used   Substance Use Topics    Alcohol use: Yes     Alcohol/week: 2.0 standard drinks of alcohol     Types: 2 Cans of beer per week     Comment: Only when watching NFL on Sunday    Drug use: Never       Review of Systems  All systems were reviewed and negative except for:   Review of Systems   Constitutional: Negative for chills, fever and unexpected weight loss.   HENT: Negative for congestion, nosebleeds and voice change.    Eyes: Negative for blurred vision, double vision and discharge.   Respiratory: Negative for apnea, chest tightness and shortness of breath.    Cardiovascular: Negative for chest pain and leg swelling.   Gastrointestinal: Negative nausea vomiting diarrhea abdominal pain   Endocrine: Negative for cold intolerance and heat intolerance.   Genitourinary: Negative for dysuria, hematuria and urgency.   Musculoskeletal: Negative for back pain, joint swelling and neck pain.   Skin: Negative for color change and dry skin.   Neurological: Negative for dizziness and confusion.   Hematological: Negative for adenopathy.   Psychiatric/Behavioral: Negative for agitation and behavioral problems.     MEDS:  Prior to Admission medications    Medication Sig Start Date End Date Taking? Authorizing Provider   ascorbic acid (VITAMIN C) 1000 MG tablet Take 1 tablet by mouth Daily. 12/8/23  Yes Destini Moreno APRN   aspirin 81 MG EC tablet aspirin 81 mg tablet,delayed release    Yes Sary, MD Sandra   buPROPion SR (WELLBUTRIN SR) 150 MG 12 hr tablet bupropion HCl  mg tablet,12 hr sustained-release   Yes Sandra Okeefe MD   carboxymethylcellulose (REFRESH PLUS) 0.5 % solution Refresh Tears 0.5 % eye drops   Yes Sandra Okeefe MD   cetirizine (zyrTEC) 10 MG tablet cetirizine 10 mg tablet   Yes Sandra Okeefe MD   cyclobenzaprine (FLEXERIL) 10 MG tablet 1 tablet   Yes ProviderSandra MD   ferrous sulfate 325 (65 FE) MG tablet ferrous sulfate 325 mg (65 mg iron) tablet   Yes Provider, MD Sandra   fluticasone (FLONASE) 50 MCG/ACT nasal spray fluticasone propionate 50 mcg/actuation nasal spray,suspension   Yes ProviderSandra MD   lisinopril (PRINIVIL,ZESTRIL) 20 MG tablet lisinopril 20 mg tablet   Yes ProviderSandra MD   meloxicam (MOBIC) 15 MG tablet Take 1 tablet by mouth Daily.   Yes Sandra Okeefe MD   Omega-3 Fatty Acids (fish oil) 1000 MG capsule capsule Fish Oil 340 mg-1,000 mg capsule   Yes Provider, MD Sandra   omeprazole (priLOSEC) 20 MG capsule omeprazole 20 mg capsule,delayed release   Yes ProviderSandra MD   propranolol (INDERAL) 40 MG tablet propranolol 40 mg tablet   Yes ProviderSandra MD   sildenafil (Viagra) 50 MG tablet    Yes ProviderSandra MD   ascorbic acid (VITAMIN C) 1000 MG tablet Daily.  Patient not taking: Reported on 4/24/2024    Sandra Okeefe MD   aspirin 81 MG EC tablet Daily.  Patient not taking: Reported on 4/24/2024    Sandra Okeefe MD   baclofen (LIORESAL) 10 MG tablet     Sandra Okeefe MD   diazePAM (VALIUM) 10 MG tablet bring 2 tabs to clinic for procedure  Patient not taking: Reported on 4/24/2024    Sandra Okeefe MD   diclofenac (VOLTAREN) 50 MG EC tablet     Sandra Okeefe MD   Diclofenac Sodium (VOLTAREN) 1 % gel gel Voltaren 1 % topical gel  Patient not taking: Reported on 12/21/2023    Sandra Okeefe MD   fluconazole  (DIFLUCAN) 200 MG tablet     Sandra Okeefe MD   HYDROcodone-acetaminophen (Norco) 5-325 MG per tablet     Sandra Okeefe MD   lidocaine (XYLOCAINE) 5 % ointment APPLY TO AFFECTED AREA(S) BY TOPICAL ROUTE 1-4 TIMES DAILY AS NEEDED  Patient not taking: Reported on 4/24/2024    Sandra Okeefe MD   pravastatin (PRAVACHOL) 20 MG tablet pravastatin 20 mg tablet  Patient not taking: Reported on 4/24/2024    Sandra Okeefe MD   promethazine (PHENERGAN) 25 MG tablet     Sandra Okeefe MD   promethazine-dextromethorphan (PROMETHAZINE-DM) 6.25-15 MG/5ML syrup Take 5 mL by mouth 4 (Four) Times a Day As Needed for Cough.  Patient not taking: Reported on 12/21/2023 12/8/23   Destini Moreno APRN   Testosterone 10 MG/ACT (2%) gel     Sandra Okeefe MD   tiZANidine (ZANAFLEX) 4 MG tablet Take 1 tablet every 6-8 hours by oral route as needed for 30 days.  Patient not taking: Reported on 4/24/2024    Sandra Okeefe MD   zinc gluconate 50 MG tablet Take 1 tablet by mouth Daily.  Patient not taking: Reported on 12/21/2023 12/8/23   Destini Moreno APRN        Allergies:  Oxycodone-acetaminophen and Simvastatin    Objective     Vital Signs   Heart Rate:  [84] 84  BP: (123)/(74) 123/74    Physical Exam:     General Appearance:    Alert, cooperative, in no acute distress   Head:    Normocephalic, without obvious abnormality, atraumatic   Eyes:          Conjunctivae and sclerae normal, no icterus,     Ears:    Ears appear intact with no abnormalities noted   Throat:   No oral lesions, no thrush, oral mucosa moist   Neck:   No adenopathy, supple, trachea midline, no thyromegaly   Back:     No kyphosis present, no scoliosis present, no skin lesions,      erythema or scars, no tenderness to percussion or                   palpation,   range of motion normal   Lungs:     Clear to auscultation,respirations regular, even and                  unlabored    Heart:    Regular rhythm and normal  "rate, normal S1 and S2, no            murmur, no gallop, no rub, no click   Chest Wall:    No abnormalities observed   Abdomen:     Normal bowel sounds, no masses, no organomegaly, soft        non-tender, non-distended, no guarding, no rebound                tenderness   Rectal:        Extremities:   Moves all extremities well, no edema, no cyanosis, no             redness   Pulses:   Pulses palpable and equal bilaterally   Skin:   No bleeding, bruising or rash, 5 mm firm mobile mass right anterior lateral neck questionable scar tissue from previous surgery   Lymph nodes:   No palpable adenopathy   Neurologic:   A/o x 4 with no deficits       Results Review:   {Results Review:11011::\"I reviewed the patient's new clinical results.\"    LABS/IMAGING:  Results for orders placed or performed during the hospital encounter of 12/08/23   POC Rapid Strep A    Specimen: Swab   Result Value Ref Range    Rapid Strep A Screen Negative     Internal Control Passed     Lot Number [id number]     Expiration Date 01/01/2025    POC Influenza A/B    Specimen: Swab   Result Value Ref Range    Rapid Influenza A Ag Negative Negative    Rapid Influenza B Ag Negative Negative    Internal Control Passed Passed    Lot Number [id number]     Expiration Date 11/01/2024    POCT SARS-CoV-2 Antigen WILLY   ([facility])    Specimen: Swab   Result Value Ref Range    SARS Antigen Detected (A) Not Detected, Presumptive Negative    Internal Control Passed Passed    Lot Number [id number]     Expiration Date 11/01/2024         Result Review :     Assessment & Plan     Right anterior neck mass questionable scar tissue from previous neck surgery    Discussion with patient.  I reviewed the ultrasound findings with the patient.  We discussed repeat ultrasound in 6 months versus excision in the OR.  I explained excision of the mass in the OR.  I explained the procedure and recovery.  Benefits and alternatives discussed.  Risk procedure including risk " of anesthesia, bleeding, infection, need for more surgery, heart attack, stroke, blood clot, pneumonia discussed.  He will think about it.  In the meantime an ultrasound of his neck was ordered for later this summer.  All questions answered.  He agrees with the plan.  Thank for the consult.              This document has been electronically signed by Uriah Ceja MD  April 25, 2024 08:27 EDT

## 2024-04-26 ENCOUNTER — LAB (OUTPATIENT)
Dept: LAB | Facility: HOSPITAL | Age: 59
End: 2024-04-26
Payer: MEDICARE

## 2024-04-26 DIAGNOSIS — R59.1 LYMPHADENOPATHY: ICD-10-CM

## 2024-04-26 LAB
BASOPHILS # BLD AUTO: 0.08 10*3/MM3 (ref 0–0.2)
BASOPHILS NFR BLD AUTO: 1 % (ref 0–1.5)
DEPRECATED RDW RBC AUTO: 39.3 FL (ref 37–54)
EOSINOPHIL # BLD AUTO: 0.37 10*3/MM3 (ref 0–0.4)
EOSINOPHIL NFR BLD AUTO: 4.6 % (ref 0.3–6.2)
ERYTHROCYTE [DISTWIDTH] IN BLOOD BY AUTOMATED COUNT: 12.7 % (ref 12.3–15.4)
HCT VFR BLD AUTO: 46.4 % (ref 37.5–51)
HGB BLD-MCNC: 15.3 G/DL (ref 13–17.7)
IMM GRANULOCYTES # BLD AUTO: 0.08 10*3/MM3 (ref 0–0.05)
IMM GRANULOCYTES NFR BLD AUTO: 1 % (ref 0–0.5)
LDH SERPL-CCNC: 290 U/L (ref 135–225)
LYMPHOCYTES # BLD AUTO: 1.8 10*3/MM3 (ref 0.7–3.1)
LYMPHOCYTES NFR BLD AUTO: 22.3 % (ref 19.6–45.3)
MCH RBC QN AUTO: 28.6 PG (ref 26.6–33)
MCHC RBC AUTO-ENTMCNC: 33 G/DL (ref 31.5–35.7)
MCV RBC AUTO: 86.7 FL (ref 79–97)
MONOCYTES # BLD AUTO: 0.71 10*3/MM3 (ref 0.1–0.9)
MONOCYTES NFR BLD AUTO: 8.8 % (ref 5–12)
NEUTROPHILS NFR BLD AUTO: 5.03 10*3/MM3 (ref 1.7–7)
NEUTROPHILS NFR BLD AUTO: 62.3 % (ref 42.7–76)
NRBC BLD AUTO-RTO: 0 /100 WBC (ref 0–0.2)
PLATELET # BLD AUTO: 262 10*3/MM3 (ref 140–450)
PMV BLD AUTO: 10 FL (ref 6–12)
RBC # BLD AUTO: 5.35 10*6/MM3 (ref 4.14–5.8)
WBC NRBC COR # BLD AUTO: 8.07 10*3/MM3 (ref 3.4–10.8)

## 2024-04-26 PROCEDURE — 83615 LACTATE (LD) (LDH) ENZYME: CPT

## 2024-04-26 PROCEDURE — 85025 COMPLETE CBC W/AUTO DIFF WBC: CPT

## 2024-04-26 PROCEDURE — 36415 COLL VENOUS BLD VENIPUNCTURE: CPT

## 2024-05-02 ENCOUNTER — OFFICE VISIT (OUTPATIENT)
Dept: SURGERY | Facility: CLINIC | Age: 59
End: 2024-05-02
Payer: MEDICARE

## 2024-05-02 ENCOUNTER — PREP FOR SURGERY (OUTPATIENT)
Dept: OTHER | Facility: HOSPITAL | Age: 59
End: 2024-05-02
Payer: MEDICARE

## 2024-05-02 VITALS — RESPIRATION RATE: 16 BRPM | HEIGHT: 71 IN | WEIGHT: 288 LBS | BODY MASS INDEX: 40.32 KG/M2

## 2024-05-02 DIAGNOSIS — R59.1 LYMPHADENOPATHY: Primary | ICD-10-CM

## 2024-05-02 RX ORDER — SODIUM CHLORIDE 9 MG/ML
40 INJECTION, SOLUTION INTRAVENOUS AS NEEDED
OUTPATIENT
Start: 2024-05-02

## 2024-05-02 RX ORDER — SODIUM CHLORIDE 0.9 % (FLUSH) 0.9 %
10 SYRINGE (ML) INJECTION EVERY 12 HOURS SCHEDULED
OUTPATIENT
Start: 2024-05-02

## 2024-05-02 RX ORDER — SODIUM CHLORIDE, SODIUM LACTATE, POTASSIUM CHLORIDE, CALCIUM CHLORIDE 600; 310; 30; 20 MG/100ML; MG/100ML; MG/100ML; MG/100ML
50 INJECTION, SOLUTION INTRAVENOUS CONTINUOUS
OUTPATIENT
Start: 2024-05-02

## 2024-05-02 RX ORDER — SODIUM CHLORIDE 0.9 % (FLUSH) 0.9 %
10 SYRINGE (ML) INJECTION AS NEEDED
OUTPATIENT
Start: 2024-05-02

## 2024-05-02 RX ORDER — BUPIVACAINE HCL/0.9 % NACL/PF 0.1 %
2000 PLASTIC BAG, INJECTION (ML) EPIDURAL ONCE
OUTPATIENT
Start: 2024-05-02 | End: 2024-05-02

## 2024-05-02 NOTE — H&P (VIEW-ONLY)
General Surgery/Colorectal Surgery Note    Patient Name:  Augustine Zaragoza  YOB: 1965  [id number]    Referring Provider: No ref. provider found      Patient Care Team:  Provider, No Known as PCP - General  Uriah Ceja MD as Consulting Physician (General Surgery)  Tata Soto APRN as Nurse Practitioner (Nurse Practitioner)    Chief complaint follow-up    Subjective .     History of present illness:    Previously seen for Barker's esophagus    History of a small mass to his right anterior neck in February.  No history of the same.  No pain.  No infectious symptoms.  No fever chills night sweats.  No dysphagia.  No voice changes.  No painful swallowing.  No weight loss.  No changes in health or medications since last seen.  Ultrasound at outside facility of the neck with 4 mm hypoechoic right neck nodule questionable level 2 lymph node.  History of 3 neck surgeries with anterior exposure.    CBC with increased immature granulocyte, elevated  4/26/2024    He comes in for follow-up.  Since last seen he remembered his father passed away from non-Hodgkin's lymphoma.  He is currently taking phentermine      History:  Past Medical History:   Diagnosis Date    Allergies     Anemia 04/15/2015    I’m on Ferous Sulfate tablets 2x a day    Anesthesia complication     ASPIRARATED - 1990'S WHEN COMING OUT OF PROCEDURE.    Arthritis     Asthma 09/11/1998    Sleep Apnea / I sleep with a BIPAP machine    Barker's esophagus     BPH (benign prostatic hyperplasia)     Depression     Essential tremor     Hyperlipidemia     Hypertension     Sleep apnea        Past Surgical History:   Procedure Laterality Date    APPENDECTOMY  1999    BACK SURGERY      CUBITAL TUNNEL RELEASE      ENDOSCOPY N/A 11/14/2022    Procedure: ESOPHAGOGASTRODUODENOSCOPY WITH BX;  Surgeon: Uriah Ceja MD;  Location: [facility] ENDOSCOPY;  Service: General;  Laterality: N/A;  HISTORY OF BARRETTS WITH LOW GRADE DYSPLASIA     ENDOSCOPY N/A 11/30/2023    Procedure: ESOPHAGOGASTRODUODENOSCOPY WITH COLD BIOPSIES;  Surgeon: Uriah Ceja MD;  Location: [facility] ENDOSCOPY;  Service: General;  Laterality: N/A;  JIMÉNEZ'S ESOPHAGUS    KNEE ACL RECONSTRUCTION Bilateral     KNEE ARTHROSCOPY      X3 RIGHT AND X3 LEFT    NECK SURGERY      x3    ULNAR NERVE REPAIR         Family History   Problem Relation Age of Onset    Cancer Father         Non Hodgkins Lymphoma    COPD Maternal Grandmother     Hypertension Maternal Grandmother     Arthritis Paternal Grandmother     Asthma Paternal Grandmother     Diabetes Paternal Grandmother     Malig Hyperthermia Neg Hx        Social History     Tobacco Use    Smoking status: Never    Smokeless tobacco: Never   Vaping Use    Vaping status: Never Used   Substance Use Topics    Alcohol use: Yes     Alcohol/week: 2.0 standard drinks of alcohol     Types: 2 Cans of beer per week     Comment: Only when watching NFL on Sunday    Drug use: Never       Review of Systems  All systems were reviewed and negative except for:   Review of Systems   Constitutional: Negative for chills, fever and unexpected weight loss.   HENT: Negative for congestion, nosebleeds and voice change.    Eyes: Negative for blurred vision, double vision and discharge.   Respiratory: Negative for apnea, chest tightness and shortness of breath.    Cardiovascular: Negative for chest pain and leg swelling.   Gastrointestinal: Negative nausea diarrhea abdominal pain   Endocrine: Negative for cold intolerance and heat intolerance.   Genitourinary: Negative for dysuria, hematuria and urgency.   Musculoskeletal: Negative for back pain, joint swelling and neck pain.   Skin: Negative for color change and dry skin.   Neurological: Negative for dizziness and confusion.   Hematological:   See HPI  Psychiatric/Behavioral: Negative for agitation and behavioral problems.     MEDS:  Prior to Admission medications    Medication Sig Start Date End Date Taking?  Authorizing Provider   ascorbic acid (VITAMIN C) 1000 MG tablet Take 1 tablet by mouth Daily. 12/8/23  Yes Destini Moreno APRN   buPROPion SR (WELLBUTRIN SR) 150 MG 12 hr tablet bupropion HCl  mg tablet,12 hr sustained-release   Yes ProviderSandra MD   carboxymethylcellulose (REFRESH PLUS) 0.5 % solution Refresh Tears 0.5 % eye drops   Yes ProviderSandra MD   cetirizine (zyrTEC) 10 MG tablet cetirizine 10 mg tablet   Yes ProviderSandra MD   cyclobenzaprine (FLEXERIL) 10 MG tablet 1 tablet   Yes Provider, MD Sandra   ferrous sulfate 325 (65 FE) MG tablet ferrous sulfate 325 mg (65 mg iron) tablet   Yes ProviderSandra MD   fluticasone (FLONASE) 50 MCG/ACT nasal spray fluticasone propionate 50 mcg/actuation nasal spray,suspension   Yes Provider, MD Sandra   lisinopril (PRINIVIL,ZESTRIL) 20 MG tablet lisinopril 20 mg tablet   Yes Provider, MD Sandra   meloxicam (MOBIC) 15 MG tablet Take 1 tablet by mouth Daily.   Yes Provider, MD Sandra   Omega-3 Fatty Acids (fish oil) 1000 MG capsule capsule Fish Oil 340 mg-1,000 mg capsule   Yes Provider, MD Sandra   omeprazole (priLOSEC) 20 MG capsule omeprazole 20 mg capsule,delayed release   Yes Provider, MD Sandra   PHENINDAMINE TARTRATE PO Take  by mouth.   Yes ProviderSandra MD   propranolol (INDERAL) 40 MG tablet propranolol 40 mg tablet   Yes ProviderSandra MD   sildenafil (Viagra) 50 MG tablet    Yes Provider, MD Sandra   Testosterone 10 MG/ACT (2%) gel    Yes Provider, MD Sandra   ascorbic acid (VITAMIN C) 1000 MG tablet Daily.  Patient not taking: Reported on 4/24/2024    Sandra Okeefe MD   aspirin 81 MG EC tablet aspirin 81 mg tablet,delayed release    Sandra Okeefe MD   aspirin 81 MG EC tablet Daily.  Patient not taking: Reported on 4/24/2024    Sandra Okeefe MD   baclofen (LIORESAL) 10 MG tablet     Sandra Okeefe MD   diazePAM (VALIUM) 10 MG tablet  bring 2 tabs to clinic for procedure  Patient not taking: Reported on 4/24/2024    Sandra Okeefe MD   diclofenac (VOLTAREN) 50 MG EC tablet     Sandra Okeefe MD   Diclofenac Sodium (VOLTAREN) 1 % gel gel Voltaren 1 % topical gel  Patient not taking: Reported on 12/21/2023    Sandra Okeefe MD   fluconazole (DIFLUCAN) 200 MG tablet     Sandra Okeefe MD   HYDROcodone-acetaminophen (Norco) 5-325 MG per tablet     Sandra Okeefe MD   lidocaine (XYLOCAINE) 5 % ointment APPLY TO AFFECTED AREA(S) BY TOPICAL ROUTE 1-4 TIMES DAILY AS NEEDED  Patient not taking: Reported on 4/24/2024    Sandra Okeefe MD   pravastatin (PRAVACHOL) 20 MG tablet pravastatin 20 mg tablet  Patient not taking: Reported on 4/24/2024    Sandra Okeefe MD   promethazine (PHENERGAN) 25 MG tablet     Sandra Okeefe MD   promethazine-dextromethorphan (PROMETHAZINE-DM) 6.25-15 MG/5ML syrup Take 5 mL by mouth 4 (Four) Times a Day As Needed for Cough.  Patient not taking: Reported on 12/21/2023 12/8/23   Destini Moreno APRN   tiZANidine (ZANAFLEX) 4 MG tablet Take 1 tablet every 6-8 hours by oral route as needed for 30 days.  Patient not taking: Reported on 4/24/2024    Sandra Okeefe MD   zinc gluconate 50 MG tablet Take 1 tablet by mouth Daily.  Patient not taking: Reported on 12/21/2023 12/8/23   Destini Moreno APRN        Allergies:  Oxycodone-acetaminophen and Simvastatin    Objective     Vital Signs   Resp:  [16] 16    Physical Exam:     General Appearance:    Alert, cooperative, in no acute distress   Head:    Normocephalic, without obvious abnormality, atraumatic   Eyes:          Conjunctivae and sclerae normal, no icterus,     Ears:    Ears appear intact with no abnormalities noted   Throat:   No oral lesions, no thrush, oral mucosa moist   Neck: Right neck soft tissue mass questionable enlarged lymph node approximately 1 cm, supple, trachea midline, no thyromegaly   Back:     " No kyphosis present, no scoliosis present, no skin lesions,      erythema or scars, no tenderness to percussion or                   palpation,   range of motion normal   Lungs:     Clear to auscultation,respirations regular, even and                  unlabored    Heart:    Regular rhythm and normal rate, normal S1 and S2, no            murmur, no gallop, no rub, no click   Chest Wall:    No abnormalities observed   Abdomen:     Normal bowel sounds, no masses, no organomegaly, soft        non-tender, non-distended, no guarding, no rebound                tenderness   Rectal:        Extremities:   Moves all extremities well, no edema, no cyanosis, no             redness   Pulses:   Pulses palpable and equal bilaterally   Skin:   No bleeding, bruising or rash   Lymph nodes:   No palpable adenopathy   Neurologic:   A/o x 4 with no deficits       Results Review:   {Results Review:89491::\"I reviewed the patient's new clinical results.\"    LABS/IMAGING:  Results for orders placed or performed in visit on 04/26/24   Lactate Dehydrogenase    Specimen: Blood   Result Value Ref Range     (H) 135 - 225 U/L   CBC Auto Differential    Specimen: Blood   Result Value Ref Range    WBC 8.07 3.40 - 10.80 10*3/mm3    RBC 5.35 4.14 - 5.80 10*6/mm3    Hemoglobin 15.3 13.0 - 17.7 g/dL    Hematocrit 46.4 37.5 - 51.0 %    MCV 86.7 79.0 - 97.0 fL    MCH 28.6 26.6 - 33.0 pg    MCHC 33.0 31.5 - 35.7 g/dL    RDW 12.7 12.3 - 15.4 %    RDW-SD 39.3 37.0 - 54.0 fl    MPV 10.0 6.0 - 12.0 fL    Platelets 262 140 - 450 10*3/mm3    Neutrophil % 62.3 42.7 - 76.0 %    Lymphocyte % 22.3 19.6 - 45.3 %    Monocyte % 8.8 5.0 - 12.0 %    Eosinophil % 4.6 0.3 - 6.2 %    Basophil % 1.0 0.0 - 1.5 %    Immature Grans % 1.0 (H) 0.0 - 0.5 %    Neutrophils, Absolute 5.03 1.70 - 7.00 10*3/mm3    Lymphocytes, Absolute 1.80 0.70 - 3.10 10*3/mm3    Monocytes, Absolute 0.71 0.10 - 0.90 10*3/mm3    Eosinophils, Absolute 0.37 0.00 - 0.40 10*3/mm3    Basophils, " Absolute 0.08 0.00 - 0.20 10*3/mm3    Immature Grans, Absolute 0.08 (H) 0.00 - 0.05 10*3/mm3    nRBC 0.0 0.0 - 0.2 /100 WBC        Result Review :     Assessment & Plan     Ultrasound at outside facility of the neck with 4 mm hypoechoic right neck nodule questionable level 2 lymph node.  History of 3 neck surgeries with anterior exposure.  CBC with increased immature granulocyte, elevated  4/26/2024  Family history father with non-Hodgkin's lymphoma    Discussion with patient.  We reviewed his labs.  I recommended excision of the questionable lymph node.  Procedure and recovery discussed.  Benefits and alternatives discussed.  Risk procedure including risk of anesthesia, bleeding, infection, need for more surgery, scarring, pain discussed.  All questions answered.  He agrees with the plan.  Orders placed.  He was instructed to use chlorhexidine the night before surgery.  Hold phentermine for 1 week.  I will also refer him to hematology for further evaluation of his labs.  All questions answered.  He agrees with the plan.  Thank you for the consult.           This document has been electronically signed by Uriah Ceja MD  May 2, 2024 15:33 EDT

## 2024-05-02 NOTE — LETTER
May 2, 2024       No Recipients    Patient: Augustine Zaragoza   YOB: 1965   Date of Visit: 5/2/2024     Dear Heaven Carvalho, MEL:       Thank you for referring Augustine Zaragoza to me for evaluation. Below are the relevant portions of my assessment and plan of care.    If you have questions, please do not hesitate to call me. I look forward to following Augustine along with you.         Sincerely,        Uriah Ceja MD        CC:   No Recipients    Uriah Ceja MD  05/02/24 1536  Sign when Signing Visit  General Surgery/Colorectal Surgery Note    Patient Name:  Augustine Zaragoza  YOB: 1965  [id number]    Referring Provider: No ref. provider found      Patient Care Team:  Provider, No Known as PCP - Uriah Sellers MD as Consulting Physician (General Surgery)  Charlesck, April, APRN as Nurse Practitioner (Nurse Practitioner)    Chief complaint follow-up    Subjective.     History of present illness:    Previously seen for Barker's esophagus    History of a small mass to his right anterior neck in February.  No history of the same.  No pain.  No infectious symptoms.  No fever chills night sweats.  No dysphagia.  No voice changes.  No painful swallowing.  No weight loss.  No changes in health or medications since last seen.  Ultrasound at outside facility of the neck with 4 mm hypoechoic right neck nodule questionable level 2 lymph node.  History of 3 neck surgeries with anterior exposure.    CBC with increased immature granulocyte, elevated  4/26/2024    He comes in for follow-up.  Since last seen he remembered his father passed away from non-Hodgkin's lymphoma.  He is currently taking phentermine      History:  Past Medical History:   Diagnosis Date   • Allergies    • Anemia 04/15/2015    I’m on Ferous Sulfate tablets 2x a day   • Anesthesia complication     ASPIRARATED - 1990'S WHEN COMING OUT OF PROCEDURE.   • Arthritis    • Asthma 09/11/1998    Sleep Apnea / I sleep  with a BIPAP machine   • Jiménez's esophagus    • BPH (benign prostatic hyperplasia)    • Depression    • Essential tremor    • Hyperlipidemia    • Hypertension    • Sleep apnea        Past Surgical History:   Procedure Laterality Date   • APPENDECTOMY  1999   • BACK SURGERY     • CUBITAL TUNNEL RELEASE     • ENDOSCOPY N/A 11/14/2022    Procedure: ESOPHAGOGASTRODUODENOSCOPY WITH BX;  Surgeon: Uriah Ceja MD;  Location: [facility] ENDOSCOPY;  Service: General;  Laterality: N/A;  HISTORY OF BARRETTS WITH LOW GRADE DYSPLASIA   • ENDOSCOPY N/A 11/30/2023    Procedure: ESOPHAGOGASTRODUODENOSCOPY WITH COLD BIOPSIES;  Surgeon: Uriah Ceja MD;  Location: [facility] ENDOSCOPY;  Service: General;  Laterality: N/A;  JIMÉNEZ'S ESOPHAGUS   • KNEE ACL RECONSTRUCTION Bilateral    • KNEE ARTHROSCOPY      X3 RIGHT AND X3 LEFT   • NECK SURGERY      x3   • ULNAR NERVE REPAIR         Family History   Problem Relation Age of Onset   • Cancer Father         Non Hodgkins Lymphoma   • COPD Maternal Grandmother    • Hypertension Maternal Grandmother    • Arthritis Paternal Grandmother    • Asthma Paternal Grandmother    • Diabetes Paternal Grandmother    • Malig Hyperthermia Neg Hx        Social History     Tobacco Use   • Smoking status: Never   • Smokeless tobacco: Never   Vaping Use   • Vaping status: Never Used   Substance Use Topics   • Alcohol use: Yes     Alcohol/week: 2.0 standard drinks of alcohol     Types: 2 Cans of beer per week     Comment: Only when watching NFL on Sunday   • Drug use: Never       Review of Systems  All systems were reviewed and negative except for:   Review of Systems   Constitutional: Negative for chills, fever and unexpected weight loss.   HENT: Negative for congestion, nosebleeds and voice change.    Eyes: Negative for blurred vision, double vision and discharge.   Respiratory: Negative for apnea, chest tightness and shortness of breath.    Cardiovascular: Negative for chest pain and leg  swelling.   Gastrointestinal: Negative nausea diarrhea abdominal pain   Endocrine: Negative for cold intolerance and heat intolerance.   Genitourinary: Negative for dysuria, hematuria and urgency.   Musculoskeletal: Negative for back pain, joint swelling and neck pain.   Skin: Negative for color change and dry skin.   Neurological: Negative for dizziness and confusion.   Hematological:   See HPI  Psychiatric/Behavioral: Negative for agitation and behavioral problems.     MEDS:  Prior to Admission medications    Medication Sig Start Date End Date Taking? Authorizing Provider   ascorbic acid (VITAMIN C) 1000 MG tablet Take 1 tablet by mouth Daily. 12/8/23  Yes Destini Moreno APRN   buPROPion SR (WELLBUTRIN SR) 150 MG 12 hr tablet bupropion HCl  mg tablet,12 hr sustained-release   Yes Provider, Historical, MD   carboxymethylcellulose (REFRESH PLUS) 0.5 % solution Refresh Tears 0.5 % eye drops   Yes Provider, Historical, MD   cetirizine (zyrTEC) 10 MG tablet cetirizine 10 mg tablet   Yes Provider, Historical, MD   cyclobenzaprine (FLEXERIL) 10 MG tablet 1 tablet   Yes Provider, Historical, MD   ferrous sulfate 325 (65 FE) MG tablet ferrous sulfate 325 mg (65 mg iron) tablet   Yes Provider, Historical, MD   fluticasone (FLONASE) 50 MCG/ACT nasal spray fluticasone propionate 50 mcg/actuation nasal spray,suspension   Yes Provider, Historical, MD   lisinopril (PRINIVIL,ZESTRIL) 20 MG tablet lisinopril 20 mg tablet   Yes Provider, Historical, MD   meloxicam (MOBIC) 15 MG tablet Take 1 tablet by mouth Daily.   Yes Provider, Historical, MD   Omega-3 Fatty Acids (fish oil) 1000 MG capsule capsule Fish Oil 340 mg-1,000 mg capsule   Yes Provider, Historical, MD   omeprazole (priLOSEC) 20 MG capsule omeprazole 20 mg capsule,delayed release   Yes Provider, Historical, MD   PHENINDAMINE TARTRATE PO Take  by mouth.   Yes Provider, Historical, MD   propranolol (INDERAL) 40 MG tablet propranolol 40 mg tablet   Yes Provider,  MD Sandra   sildenafil (Viagra) 50 MG tablet    Yes Sandra Okeefe MD   Testosterone 10 MG/ACT (2%) gel    Yes Sandra Okeefe MD   ascorbic acid (VITAMIN C) 1000 MG tablet Daily.  Patient not taking: Reported on 4/24/2024    Sandra Okeefe MD   aspirin 81 MG EC tablet aspirin 81 mg tablet,delayed release    Sandra Okeefe MD   aspirin 81 MG EC tablet Daily.  Patient not taking: Reported on 4/24/2024    Sandra Okeefe MD   baclofen (LIORESAL) 10 MG tablet     Sandra Okeefe MD   diazePAM (VALIUM) 10 MG tablet bring 2 tabs to clinic for procedure  Patient not taking: Reported on 4/24/2024    Sandra Okeefe MD   diclofenac (VOLTAREN) 50 MG EC tablet     Sandra Okeefe MD   Diclofenac Sodium (VOLTAREN) 1 % gel gel Voltaren 1 % topical gel  Patient not taking: Reported on 12/21/2023    Sandra Okeefe MD   fluconazole (DIFLUCAN) 200 MG tablet     Sandra Okeefe MD   HYDROcodone-acetaminophen (Norco) 5-325 MG per tablet     Sandra Okeefe MD   lidocaine (XYLOCAINE) 5 % ointment APPLY TO AFFECTED AREA(S) BY TOPICAL ROUTE 1-4 TIMES DAILY AS NEEDED  Patient not taking: Reported on 4/24/2024    Sandra Okeefe MD   pravastatin (PRAVACHOL) 20 MG tablet pravastatin 20 mg tablet  Patient not taking: Reported on 4/24/2024    Sandra Okeefe MD   promethazine (PHENERGAN) 25 MG tablet     Sandra Okeefe MD   promethazine-dextromethorphan (PROMETHAZINE-DM) 6.25-15 MG/5ML syrup Take 5 mL by mouth 4 (Four) Times a Day As Needed for Cough.  Patient not taking: Reported on 12/21/2023 12/8/23   Destini Moreno APRN   tiZANidine (ZANAFLEX) 4 MG tablet Take 1 tablet every 6-8 hours by oral route as needed for 30 days.  Patient not taking: Reported on 4/24/2024    Sandra Okeefe MD   zinc gluconate 50 MG tablet Take 1 tablet by mouth Daily.  Patient not taking: Reported on 12/21/2023 12/8/23   Destini Moreno APRN     "    Allergies:  Oxycodone-acetaminophen and Simvastatin    Objective    Vital Signs   Resp:  [16] 16    Physical Exam:     General Appearance:    Alert, cooperative, in no acute distress   Head:    Normocephalic, without obvious abnormality, atraumatic   Eyes:          Conjunctivae and sclerae normal, no icterus,     Ears:    Ears appear intact with no abnormalities noted   Throat:   No oral lesions, no thrush, oral mucosa moist   Neck: Right neck soft tissue mass questionable enlarged lymph node approximately 1 cm, supple, trachea midline, no thyromegaly   Back:     No kyphosis present, no scoliosis present, no skin lesions,      erythema or scars, no tenderness to percussion or                   palpation,   range of motion normal   Lungs:     Clear to auscultation,respirations regular, even and                  unlabored    Heart:    Regular rhythm and normal rate, normal S1 and S2, no            murmur, no gallop, no rub, no click   Chest Wall:    No abnormalities observed   Abdomen:     Normal bowel sounds, no masses, no organomegaly, soft        non-tender, non-distended, no guarding, no rebound                tenderness   Rectal:        Extremities:   Moves all extremities well, no edema, no cyanosis, no             redness   Pulses:   Pulses palpable and equal bilaterally   Skin:   No bleeding, bruising or rash   Lymph nodes:   No palpable adenopathy   Neurologic:   A/o x 4 with no deficits       Results Review:   {Results Review:75630::\"I reviewed the patient's new clinical results.\"    LABS/IMAGING:  Results for orders placed or performed in visit on 04/26/24   Lactate Dehydrogenase    Specimen: Blood   Result Value Ref Range     (H) 135 - 225 U/L   CBC Auto Differential    Specimen: Blood   Result Value Ref Range    WBC 8.07 3.40 - 10.80 10*3/mm3    RBC 5.35 4.14 - 5.80 10*6/mm3    Hemoglobin 15.3 13.0 - 17.7 g/dL    Hematocrit 46.4 37.5 - 51.0 %    MCV 86.7 79.0 - 97.0 fL    MCH 28.6 26.6 - 33.0 " pg    MCHC 33.0 31.5 - 35.7 g/dL    RDW 12.7 12.3 - 15.4 %    RDW-SD 39.3 37.0 - 54.0 fl    MPV 10.0 6.0 - 12.0 fL    Platelets 262 140 - 450 10*3/mm3    Neutrophil % 62.3 42.7 - 76.0 %    Lymphocyte % 22.3 19.6 - 45.3 %    Monocyte % 8.8 5.0 - 12.0 %    Eosinophil % 4.6 0.3 - 6.2 %    Basophil % 1.0 0.0 - 1.5 %    Immature Grans % 1.0 (H) 0.0 - 0.5 %    Neutrophils, Absolute 5.03 1.70 - 7.00 10*3/mm3    Lymphocytes, Absolute 1.80 0.70 - 3.10 10*3/mm3    Monocytes, Absolute 0.71 0.10 - 0.90 10*3/mm3    Eosinophils, Absolute 0.37 0.00 - 0.40 10*3/mm3    Basophils, Absolute 0.08 0.00 - 0.20 10*3/mm3    Immature Grans, Absolute 0.08 (H) 0.00 - 0.05 10*3/mm3    nRBC 0.0 0.0 - 0.2 /100 WBC        Result Review:     Assessment & Plan    Ultrasound at outside facility of the neck with 4 mm hypoechoic right neck nodule questionable level 2 lymph node.  History of 3 neck surgeries with anterior exposure.  CBC with increased immature granulocyte, elevated  4/26/2024  Family history father with non-Hodgkin's lymphoma    Discussion with patient.  We reviewed his labs.  I recommended excision of the questionable lymph node.  Procedure and recovery discussed.  Benefits and alternatives discussed.  Risk procedure including risk of anesthesia, bleeding, infection, need for more surgery, scarring, pain discussed.  All questions answered.  He agrees with the plan.  Orders placed.  He was instructed to use chlorhexidine the night before surgery.  Hold phentermine for 1 week.  I will also refer him to hematology for further evaluation of his labs.  All questions answered.  He agrees with the plan.  Thank you for the consult.           This document has been electronically signed by Uriah Ceja MD  May 2, 2024 15:33 EDT

## 2024-05-02 NOTE — PROGRESS NOTES
General Surgery/Colorectal Surgery Note    Patient Name:  Augustine Zaragoza  YOB: 1965  [id number]    Referring Provider: No ref. provider found      Patient Care Team:  Provider, No Known as PCP - General  Uriah Ceja MD as Consulting Physician (General Surgery)  Tata Soto APRN as Nurse Practitioner (Nurse Practitioner)    Chief complaint follow-up    Subjective .     History of present illness:    Previously seen for Barker's esophagus    History of a small mass to his right anterior neck in February.  No history of the same.  No pain.  No infectious symptoms.  No fever chills night sweats.  No dysphagia.  No voice changes.  No painful swallowing.  No weight loss.  No changes in health or medications since last seen.  Ultrasound at outside facility of the neck with 4 mm hypoechoic right neck nodule questionable level 2 lymph node.  History of 3 neck surgeries with anterior exposure.    CBC with increased immature granulocyte, elevated  4/26/2024    He comes in for follow-up.  Since last seen he remembered his father passed away from non-Hodgkin's lymphoma.  He is currently taking phentermine      History:  Past Medical History:   Diagnosis Date    Allergies     Anemia 04/15/2015    I’m on Ferous Sulfate tablets 2x a day    Anesthesia complication     ASPIRARATED - 1990'S WHEN COMING OUT OF PROCEDURE.    Arthritis     Asthma 09/11/1998    Sleep Apnea / I sleep with a BIPAP machine    Barker's esophagus     BPH (benign prostatic hyperplasia)     Depression     Essential tremor     Hyperlipidemia     Hypertension     Sleep apnea        Past Surgical History:   Procedure Laterality Date    APPENDECTOMY  1999    BACK SURGERY      CUBITAL TUNNEL RELEASE      ENDOSCOPY N/A 11/14/2022    Procedure: ESOPHAGOGASTRODUODENOSCOPY WITH BX;  Surgeon: Uriah Ceja MD;  Location: [facility] ENDOSCOPY;  Service: General;  Laterality: N/A;  HISTORY OF BARRETTS WITH LOW GRADE DYSPLASIA     ENDOSCOPY N/A 11/30/2023    Procedure: ESOPHAGOGASTRODUODENOSCOPY WITH COLD BIOPSIES;  Surgeon: Uriah Ceja MD;  Location: [facility] ENDOSCOPY;  Service: General;  Laterality: N/A;  JIMÉNEZ'S ESOPHAGUS    KNEE ACL RECONSTRUCTION Bilateral     KNEE ARTHROSCOPY      X3 RIGHT AND X3 LEFT    NECK SURGERY      x3    ULNAR NERVE REPAIR         Family History   Problem Relation Age of Onset    Cancer Father         Non Hodgkins Lymphoma    COPD Maternal Grandmother     Hypertension Maternal Grandmother     Arthritis Paternal Grandmother     Asthma Paternal Grandmother     Diabetes Paternal Grandmother     Malig Hyperthermia Neg Hx        Social History     Tobacco Use    Smoking status: Never    Smokeless tobacco: Never   Vaping Use    Vaping status: Never Used   Substance Use Topics    Alcohol use: Yes     Alcohol/week: 2.0 standard drinks of alcohol     Types: 2 Cans of beer per week     Comment: Only when watching NFL on Sunday    Drug use: Never       Review of Systems  All systems were reviewed and negative except for:   Review of Systems   Constitutional: Negative for chills, fever and unexpected weight loss.   HENT: Negative for congestion, nosebleeds and voice change.    Eyes: Negative for blurred vision, double vision and discharge.   Respiratory: Negative for apnea, chest tightness and shortness of breath.    Cardiovascular: Negative for chest pain and leg swelling.   Gastrointestinal: Negative nausea diarrhea abdominal pain   Endocrine: Negative for cold intolerance and heat intolerance.   Genitourinary: Negative for dysuria, hematuria and urgency.   Musculoskeletal: Negative for back pain, joint swelling and neck pain.   Skin: Negative for color change and dry skin.   Neurological: Negative for dizziness and confusion.   Hematological:   See HPI  Psychiatric/Behavioral: Negative for agitation and behavioral problems.     MEDS:  Prior to Admission medications    Medication Sig Start Date End Date Taking?  Authorizing Provider   ascorbic acid (VITAMIN C) 1000 MG tablet Take 1 tablet by mouth Daily. 12/8/23  Yes Destini Moreno APRN   buPROPion SR (WELLBUTRIN SR) 150 MG 12 hr tablet bupropion HCl  mg tablet,12 hr sustained-release   Yes ProviderSandra MD   carboxymethylcellulose (REFRESH PLUS) 0.5 % solution Refresh Tears 0.5 % eye drops   Yes ProviderSandra MD   cetirizine (zyrTEC) 10 MG tablet cetirizine 10 mg tablet   Yes ProviderSandra MD   cyclobenzaprine (FLEXERIL) 10 MG tablet 1 tablet   Yes Provider, MD Sandra   ferrous sulfate 325 (65 FE) MG tablet ferrous sulfate 325 mg (65 mg iron) tablet   Yes ProviderSandra MD   fluticasone (FLONASE) 50 MCG/ACT nasal spray fluticasone propionate 50 mcg/actuation nasal spray,suspension   Yes Provider, MD Sandra   lisinopril (PRINIVIL,ZESTRIL) 20 MG tablet lisinopril 20 mg tablet   Yes Provider, MD Sandra   meloxicam (MOBIC) 15 MG tablet Take 1 tablet by mouth Daily.   Yes Provider, MD Sandra   Omega-3 Fatty Acids (fish oil) 1000 MG capsule capsule Fish Oil 340 mg-1,000 mg capsule   Yes Provider, MD Sandra   omeprazole (priLOSEC) 20 MG capsule omeprazole 20 mg capsule,delayed release   Yes Provider, MD Sandra   PHENINDAMINE TARTRATE PO Take  by mouth.   Yes ProviderSandra MD   propranolol (INDERAL) 40 MG tablet propranolol 40 mg tablet   Yes ProviderSandra MD   sildenafil (Viagra) 50 MG tablet    Yes Provider, MD Sandra   Testosterone 10 MG/ACT (2%) gel    Yes Provider, MD Sandra   ascorbic acid (VITAMIN C) 1000 MG tablet Daily.  Patient not taking: Reported on 4/24/2024    Sandra Okeefe MD   aspirin 81 MG EC tablet aspirin 81 mg tablet,delayed release    Sandra Okeefe MD   aspirin 81 MG EC tablet Daily.  Patient not taking: Reported on 4/24/2024    Sandra Okeefe MD   baclofen (LIORESAL) 10 MG tablet     Sandra Okeefe MD   diazePAM (VALIUM) 10 MG tablet  bring 2 tabs to clinic for procedure  Patient not taking: Reported on 4/24/2024    Sandra Okeefe MD   diclofenac (VOLTAREN) 50 MG EC tablet     Sandra Okeefe MD   Diclofenac Sodium (VOLTAREN) 1 % gel gel Voltaren 1 % topical gel  Patient not taking: Reported on 12/21/2023    Sandra Okeefe MD   fluconazole (DIFLUCAN) 200 MG tablet     Sandra Okeefe MD   HYDROcodone-acetaminophen (Norco) 5-325 MG per tablet     Sandra kOeefe MD   lidocaine (XYLOCAINE) 5 % ointment APPLY TO AFFECTED AREA(S) BY TOPICAL ROUTE 1-4 TIMES DAILY AS NEEDED  Patient not taking: Reported on 4/24/2024    Sandra Okeefe MD   pravastatin (PRAVACHOL) 20 MG tablet pravastatin 20 mg tablet  Patient not taking: Reported on 4/24/2024    Sandra Okeefe MD   promethazine (PHENERGAN) 25 MG tablet     Sandra Okeefe MD   promethazine-dextromethorphan (PROMETHAZINE-DM) 6.25-15 MG/5ML syrup Take 5 mL by mouth 4 (Four) Times a Day As Needed for Cough.  Patient not taking: Reported on 12/21/2023 12/8/23   Destini Moreno APRN   tiZANidine (ZANAFLEX) 4 MG tablet Take 1 tablet every 6-8 hours by oral route as needed for 30 days.  Patient not taking: Reported on 4/24/2024    Sandra Okeefe MD   zinc gluconate 50 MG tablet Take 1 tablet by mouth Daily.  Patient not taking: Reported on 12/21/2023 12/8/23   Destini Moreno APRN        Allergies:  Oxycodone-acetaminophen and Simvastatin    Objective     Vital Signs   Resp:  [16] 16    Physical Exam:     General Appearance:    Alert, cooperative, in no acute distress   Head:    Normocephalic, without obvious abnormality, atraumatic   Eyes:          Conjunctivae and sclerae normal, no icterus,     Ears:    Ears appear intact with no abnormalities noted   Throat:   No oral lesions, no thrush, oral mucosa moist   Neck: Right neck soft tissue mass questionable enlarged lymph node approximately 1 cm, supple, trachea midline, no thyromegaly   Back:     " No kyphosis present, no scoliosis present, no skin lesions,      erythema or scars, no tenderness to percussion or                   palpation,   range of motion normal   Lungs:     Clear to auscultation,respirations regular, even and                  unlabored    Heart:    Regular rhythm and normal rate, normal S1 and S2, no            murmur, no gallop, no rub, no click   Chest Wall:    No abnormalities observed   Abdomen:     Normal bowel sounds, no masses, no organomegaly, soft        non-tender, non-distended, no guarding, no rebound                tenderness   Rectal:        Extremities:   Moves all extremities well, no edema, no cyanosis, no             redness   Pulses:   Pulses palpable and equal bilaterally   Skin:   No bleeding, bruising or rash   Lymph nodes:   No palpable adenopathy   Neurologic:   A/o x 4 with no deficits       Results Review:   {Results Review:10812::\"I reviewed the patient's new clinical results.\"    LABS/IMAGING:  Results for orders placed or performed in visit on 04/26/24   Lactate Dehydrogenase    Specimen: Blood   Result Value Ref Range     (H) 135 - 225 U/L   CBC Auto Differential    Specimen: Blood   Result Value Ref Range    WBC 8.07 3.40 - 10.80 10*3/mm3    RBC 5.35 4.14 - 5.80 10*6/mm3    Hemoglobin 15.3 13.0 - 17.7 g/dL    Hematocrit 46.4 37.5 - 51.0 %    MCV 86.7 79.0 - 97.0 fL    MCH 28.6 26.6 - 33.0 pg    MCHC 33.0 31.5 - 35.7 g/dL    RDW 12.7 12.3 - 15.4 %    RDW-SD 39.3 37.0 - 54.0 fl    MPV 10.0 6.0 - 12.0 fL    Platelets 262 140 - 450 10*3/mm3    Neutrophil % 62.3 42.7 - 76.0 %    Lymphocyte % 22.3 19.6 - 45.3 %    Monocyte % 8.8 5.0 - 12.0 %    Eosinophil % 4.6 0.3 - 6.2 %    Basophil % 1.0 0.0 - 1.5 %    Immature Grans % 1.0 (H) 0.0 - 0.5 %    Neutrophils, Absolute 5.03 1.70 - 7.00 10*3/mm3    Lymphocytes, Absolute 1.80 0.70 - 3.10 10*3/mm3    Monocytes, Absolute 0.71 0.10 - 0.90 10*3/mm3    Eosinophils, Absolute 0.37 0.00 - 0.40 10*3/mm3    Basophils, " Absolute 0.08 0.00 - 0.20 10*3/mm3    Immature Grans, Absolute 0.08 (H) 0.00 - 0.05 10*3/mm3    nRBC 0.0 0.0 - 0.2 /100 WBC        Result Review :     Assessment & Plan     Ultrasound at outside facility of the neck with 4 mm hypoechoic right neck nodule questionable level 2 lymph node.  History of 3 neck surgeries with anterior exposure.  CBC with increased immature granulocyte, elevated  4/26/2024  Family history father with non-Hodgkin's lymphoma    Discussion with patient.  We reviewed his labs.  I recommended excision of the questionable lymph node.  Procedure and recovery discussed.  Benefits and alternatives discussed.  Risk procedure including risk of anesthesia, bleeding, infection, need for more surgery, scarring, pain discussed.  All questions answered.  He agrees with the plan.  Orders placed.  He was instructed to use chlorhexidine the night before surgery.  Hold phentermine for 1 week.  I will also refer him to hematology for further evaluation of his labs.  All questions answered.  He agrees with the plan.  Thank you for the consult.           This document has been electronically signed by Uriah Ceja MD  May 2, 2024 15:33 EDT

## 2024-05-07 RX ORDER — PHENTERMINE HYDROCHLORIDE 30 MG/1
30 CAPSULE ORAL EVERY MORNING
COMMUNITY

## 2024-05-07 RX ORDER — MULTIPLE VITAMINS W/ MINERALS TAB 9MG-400MCG
1 TAB ORAL DAILY
COMMUNITY

## 2024-05-07 NOTE — PRE-PROCEDURE INSTRUCTIONS
IMPORTANT INSTRUCTIONS - PRE-ADMISSION TESTING  DO NOT EAT OR DRINK  after midnight the night before your procedure.    .   Take the following medications the morning of your procedure with JUST A SIP OF WATER:  WELLBUTRIN, ZYRTEC, FLEXERIL, OMEPRAZOLE _______________________________________________________________________________________________________________________________________________________________________________________    DO NOT BRING your medications to the hospital with you, UNLESS something has changed since your PRE-Admission Testing appointment.  Hold all vitamins, supplements, and NSAIDS (Non- steroidal anti-inflammatory meds) for one week prior to surgery (you MAY take Tylenol or Acetaminophen).  If you are diabetic, check your blood sugar the morning of your procedure. If it is less than 70 or if you are feeling symptomatic, call the following number for further instructions: [phone]  Use your inhalers/nebulizers as usual, the morning of your procedure. BRING YOUR INHALERS with you.   Bring your CPAP or BIPAP to hospital, ONLY IF YOU WILL BE SPENDING THE NIGHT.   Make sure you have a ride home and have someone who will stay with you the day of your procedure after you go home.  If you have any questions, please call your Pre-Admission Testing Nurse, __EDMUND__ at [phone]____.   Per anesthesia request, do not smoke for 24 hours before your procedure or as instructed by your surgeon.

## 2024-05-10 ENCOUNTER — HOSPITAL ENCOUNTER (OUTPATIENT)
Facility: HOSPITAL | Age: 59
Setting detail: HOSPITAL OUTPATIENT SURGERY
Discharge: HOME | End: 2024-05-10
Attending: SURGERY | Admitting: SURGERY
Payer: MEDICARE

## 2024-05-10 ENCOUNTER — ANESTHESIA EVENT (OUTPATIENT)
Dept: PERIOP | Facility: HOSPITAL | Age: 59
End: 2024-05-10
Payer: MEDICARE

## 2024-05-10 ENCOUNTER — ANESTHESIA (OUTPATIENT)
Dept: PERIOP | Facility: HOSPITAL | Age: 59
End: 2024-05-10
Payer: MEDICARE

## 2024-05-10 VITALS
BODY MASS INDEX: 40.09 KG/M2 | SYSTOLIC BLOOD PRESSURE: 123 MMHG | RESPIRATION RATE: 19 BRPM | HEART RATE: 93 BPM | HEIGHT: 71 IN | DIASTOLIC BLOOD PRESSURE: 76 MMHG | OXYGEN SATURATION: 98 % | TEMPERATURE: 98.1 F | WEIGHT: 286.38 LBS

## 2024-05-10 DIAGNOSIS — R59.1 LYMPHADENOPATHY: ICD-10-CM

## 2024-05-10 PROCEDURE — 25010000002 CEFAZOLIN PER 500 MG: Performed by: SURGERY

## 2024-05-10 PROCEDURE — 38500 BIOPSY/REMOVAL LYMPH NODES: CPT | Performed by: SURGERY

## 2024-05-10 PROCEDURE — 25010000002 MIDAZOLAM PER 1MG: Performed by: ANESTHESIOLOGY

## 2024-05-10 PROCEDURE — 25010000002 PROPOFOL 10 MG/ML EMULSION: Performed by: NURSE ANESTHETIST, CERTIFIED REGISTERED

## 2024-05-10 PROCEDURE — 88305 TISSUE EXAM BY PATHOLOGIST: CPT | Performed by: SURGERY

## 2024-05-10 PROCEDURE — 25010000002 ONDANSETRON PER 1 MG: Performed by: NURSE ANESTHETIST, CERTIFIED REGISTERED

## 2024-05-10 PROCEDURE — 25810000003 LACTATED RINGERS PER 1000 ML: Performed by: ANESTHESIOLOGY

## 2024-05-10 PROCEDURE — 25010000002 FENTANYL CITRATE (PF) 50 MCG/ML SOLUTION: Performed by: NURSE ANESTHETIST, CERTIFIED REGISTERED

## 2024-05-10 PROCEDURE — 25010000002 DEXAMETHASONE PER 1 MG: Performed by: NURSE ANESTHETIST, CERTIFIED REGISTERED

## 2024-05-10 RX ORDER — OXYCODONE HYDROCHLORIDE 5 MG/1
5 TABLET ORAL
Status: DISCONTINUED | OUTPATIENT
Start: 2024-05-10 | End: 2024-05-10

## 2024-05-10 RX ORDER — DEXAMETHASONE SODIUM PHOSPHATE 4 MG/ML
INJECTION, SOLUTION INTRA-ARTICULAR; INTRALESIONAL; INTRAMUSCULAR; INTRAVENOUS; SOFT TISSUE AS NEEDED
Status: DISCONTINUED | OUTPATIENT
Start: 2024-05-10 | End: 2024-05-10 | Stop reason: SURG

## 2024-05-10 RX ORDER — PROMETHAZINE HYDROCHLORIDE 12.5 MG/1
25 TABLET ORAL ONCE AS NEEDED
Status: DISCONTINUED | OUTPATIENT
Start: 2024-05-10 | End: 2024-05-10 | Stop reason: HOSPADM

## 2024-05-10 RX ORDER — MIDAZOLAM HYDROCHLORIDE 2 MG/2ML
2 INJECTION, SOLUTION INTRAMUSCULAR; INTRAVENOUS ONCE
Status: COMPLETED | OUTPATIENT
Start: 2024-05-10 | End: 2024-05-10

## 2024-05-10 RX ORDER — PROMETHAZINE HYDROCHLORIDE 25 MG/1
25 SUPPOSITORY RECTAL ONCE AS NEEDED
Status: DISCONTINUED | OUTPATIENT
Start: 2024-05-10 | End: 2024-05-10 | Stop reason: HOSPADM

## 2024-05-10 RX ORDER — LIDOCAINE HYDROCHLORIDE 20 MG/ML
INJECTION, SOLUTION EPIDURAL; INFILTRATION; INTRACAUDAL; PERINEURAL AS NEEDED
Status: DISCONTINUED | OUTPATIENT
Start: 2024-05-10 | End: 2024-05-10 | Stop reason: SURG

## 2024-05-10 RX ORDER — PROPOFOL 10 MG/ML
VIAL (ML) INTRAVENOUS AS NEEDED
Status: DISCONTINUED | OUTPATIENT
Start: 2024-05-10 | End: 2024-05-10 | Stop reason: SURG

## 2024-05-10 RX ORDER — POLYETHYLENE GLYCOL 3350 17 G/17G
17 POWDER, FOR SOLUTION ORAL DAILY
Qty: 5 PACKET | Refills: 0 | Status: SHIPPED | OUTPATIENT
Start: 2024-05-10

## 2024-05-10 RX ORDER — ONDANSETRON 2 MG/ML
4 INJECTION INTRAMUSCULAR; INTRAVENOUS ONCE AS NEEDED
Status: DISCONTINUED | OUTPATIENT
Start: 2024-05-10 | End: 2024-05-10 | Stop reason: HOSPADM

## 2024-05-10 RX ORDER — FENTANYL CITRATE 50 UG/ML
INJECTION, SOLUTION INTRAMUSCULAR; INTRAVENOUS AS NEEDED
Status: DISCONTINUED | OUTPATIENT
Start: 2024-05-10 | End: 2024-05-10 | Stop reason: SURG

## 2024-05-10 RX ORDER — SODIUM CHLORIDE 0.9 % (FLUSH) 0.9 %
10 SYRINGE (ML) INJECTION AS NEEDED
Status: DISCONTINUED | OUTPATIENT
Start: 2024-05-10 | End: 2024-05-10 | Stop reason: HOSPADM

## 2024-05-10 RX ORDER — SODIUM CHLORIDE, SODIUM LACTATE, POTASSIUM CHLORIDE, CALCIUM CHLORIDE 600; 310; 30; 20 MG/100ML; MG/100ML; MG/100ML; MG/100ML
9 INJECTION, SOLUTION INTRAVENOUS CONTINUOUS PRN
Status: DISCONTINUED | OUTPATIENT
Start: 2024-05-10 | End: 2024-05-10 | Stop reason: HOSPADM

## 2024-05-10 RX ORDER — MEPERIDINE HYDROCHLORIDE 25 MG/ML
12.5 INJECTION INTRAMUSCULAR; INTRAVENOUS; SUBCUTANEOUS
Status: DISCONTINUED | OUTPATIENT
Start: 2024-05-10 | End: 2024-05-10 | Stop reason: HOSPADM

## 2024-05-10 RX ORDER — ACETAMINOPHEN 500 MG
500 TABLET ORAL ONCE
Status: COMPLETED | OUTPATIENT
Start: 2024-05-10 | End: 2024-05-10

## 2024-05-10 RX ORDER — SODIUM CHLORIDE 9 MG/ML
40 INJECTION, SOLUTION INTRAVENOUS AS NEEDED
Status: DISCONTINUED | OUTPATIENT
Start: 2024-05-10 | End: 2024-05-10 | Stop reason: HOSPADM

## 2024-05-10 RX ORDER — DEXAMETHASONE SODIUM PHOSPHATE 4 MG/ML
INJECTION, SOLUTION INTRA-ARTICULAR; INTRALESIONAL; INTRAMUSCULAR; INTRAVENOUS; SOFT TISSUE AS NEEDED
Status: DISCONTINUED | OUTPATIENT
Start: 2024-05-10 | End: 2024-05-10

## 2024-05-10 RX ORDER — SODIUM CHLORIDE 0.9 % (FLUSH) 0.9 %
10 SYRINGE (ML) INJECTION EVERY 12 HOURS SCHEDULED
Status: DISCONTINUED | OUTPATIENT
Start: 2024-05-10 | End: 2024-05-10 | Stop reason: HOSPADM

## 2024-05-10 RX ORDER — ONDANSETRON 2 MG/ML
INJECTION INTRAMUSCULAR; INTRAVENOUS AS NEEDED
Status: DISCONTINUED | OUTPATIENT
Start: 2024-05-10 | End: 2024-05-10 | Stop reason: SURG

## 2024-05-10 RX ORDER — HYDROCODONE BITARTRATE AND ACETAMINOPHEN 5; 325 MG/1; MG/1
1 TABLET ORAL EVERY 6 HOURS PRN
Qty: 6 TABLET | Refills: 0 | Status: SHIPPED | OUTPATIENT
Start: 2024-05-10

## 2024-05-10 RX ORDER — SODIUM CHLORIDE, SODIUM LACTATE, POTASSIUM CHLORIDE, CALCIUM CHLORIDE 600; 310; 30; 20 MG/100ML; MG/100ML; MG/100ML; MG/100ML
50 INJECTION, SOLUTION INTRAVENOUS CONTINUOUS
Status: DISCONTINUED | OUTPATIENT
Start: 2024-05-10 | End: 2024-05-10 | Stop reason: HOSPADM

## 2024-05-10 RX ADMIN — ACETAMINOPHEN 500 MG: 500 TABLET ORAL at 08:18

## 2024-05-10 RX ADMIN — SODIUM CHLORIDE 2000 MG: 9 INJECTION, SOLUTION INTRAVENOUS at 09:40

## 2024-05-10 RX ADMIN — DEXAMETHASONE SODIUM PHOSPHATE 4 MG: 4 INJECTION, SOLUTION INTRAMUSCULAR; INTRAVENOUS at 09:52

## 2024-05-10 RX ADMIN — LIDOCAINE HYDROCHLORIDE 60 MG: 20 INJECTION, SOLUTION INTRAVENOUS at 09:41

## 2024-05-10 RX ADMIN — MIDAZOLAM HYDROCHLORIDE 2 MG: 1 INJECTION, SOLUTION INTRAMUSCULAR; INTRAVENOUS at 09:06

## 2024-05-10 RX ADMIN — FENTANYL CITRATE 50 MCG: 50 INJECTION, SOLUTION INTRAMUSCULAR; INTRAVENOUS at 09:56

## 2024-05-10 RX ADMIN — PROPOFOL 200 MG: 10 INJECTION, EMULSION INTRAVENOUS at 09:41

## 2024-05-10 RX ADMIN — SODIUM CHLORIDE, POTASSIUM CHLORIDE, SODIUM LACTATE AND CALCIUM CHLORIDE 9 ML/HR: 600; 310; 30; 20 INJECTION, SOLUTION INTRAVENOUS at 08:18

## 2024-05-10 RX ADMIN — FENTANYL CITRATE 50 MCG: 50 INJECTION, SOLUTION INTRAMUSCULAR; INTRAVENOUS at 09:41

## 2024-05-10 RX ADMIN — ONDANSETRON HYDROCHLORIDE 4 MG: 2 SOLUTION INTRAMUSCULAR; INTRAVENOUS at 09:52

## 2024-05-10 NOTE — OP NOTE
OP NOTE  BIOPSY LYMPH NODE  Procedure Report    Patient Name:  Augustine Zaragoza  YOB: 1965  [id number]    Date of Surgery:  5/10/2024     Indications: See last clinic note for indications, discussion of risk benefits alternatives    Pre-op Diagnosis:   Lymphadenopathy [R59.1]       Post-Op Diagnosis Codes:     * Lymphadenopathy [R59.1]    Procedure/CPT® Codes:      Procedure(s): Excisional biopsy right neck lymph node    Staff:  Surgeon(s):  Uriah Ceja MD         Anesthesia: General, Local    Estimated Blood Loss: minimal    Implants:    Nothing was implanted during the procedure    Specimen:          Specimens       ID Source Type Tests Collected By Collected At Frozen?    A Lymph Node Tissue TISSUE PATHOLOGY EXAM   Uriah Ceja MD 5/10/24 0957     Description: neck lymph node biopsy fresh for lymphoma              Findings: Excisional biopsy of suspected right neck lymph node approximately 6 x 6 x 6 mm    Complications: None    Description of Procedure:   After all questions were answered, consent was verified, site was marked.  Patient brought to the operating room per stretcher placed in supine position arms out all extremities padded.  Bilateral lower extremity SCDs placed.  Anesthesia induced.  Preoperative IV antibiotics administered.  Patient's head turned to the left.  Patient's right neck prepped with ChloraPrep.  Waited 3 minutes.  We draped in usual sterile fashion.  Critical timeout taken.  Began the procedure infiltrating local anesthesia.  I made a transverse incision over the palpated mass.  I excised a 6 x 6 x 6 mm suspected lymph node from the right neck.  This was anterior to the neck muscles.  I verified hemostasis.  It was sent to pathology fresh for workup for possible lymphoma.  I closed the incision with Vicryl Monocryl and skin glue.  At the end of the procedure all counts were correct.  I was present for the procedure.    Belgica Phelps was responsible for  performing the following activities: Retraction, Suction, and Placing Dressing and their skilled assistance was necessary for the success of this case.    Uriah Ceja MD     Date: 5/10/2024  Time: 10:14 EDT

## 2024-05-10 NOTE — DISCHARGE INSTRUCTIONS
DISCHARGE INSTRUCTIONS  SURGICAL / AMBULATORY  PROCEDURES      For your surgery you had:  General anesthesia (you may have a sore throat for the first 24 hours)  IV sedation.  Local anesthesia  Monitored anesthesia Care     You may experience dizziness, drowsiness, or light-headedness for several hours following surgery/procedure.  Do not stay alone today or tonight.  Limit your activity for 24 hours.  Resume your diet slowly.  Follow whatever special dietary instructions you may have been given by your doctor.  You should not drive or operate machinery, drink alcohol, or sign legally binding documents for 24 hours or while you are taking pain medication.  Last dose of pain medication was given at:  Tylenol at 0818AM mAY START PAIN MEDS AFTER 1200 NOON TODAY .  NOTIFY YOUR DOCTOR IF YOU EXPERIENCE ANY OF THE FOLLOWING:  Temperature greater than 101 degrees Fahrenheit  Shaking Chills  Redness or excessive drainage from incision  Nausea, vomiting and/or pain that is not controlled by prescribed medications  Increase in bleeding or bleeding that is excessive  Unable to urinate in 6 hours after surgery  If unable to reach your doctor, please go to the closest Emergency Room  You may begin dressing changes:     [] in 24 hours   [] in 48 hours   [x] Other: INCISION IS SEALED WITH GLUE WILL FLAKE OFF IN 7-10 DAYS      You may shower 24 HRS   .  Apply an ice pack 24-48 hours.  Medications per physician instructions as indicated on Discharge Medication Information Sheet.  You should see Dr. OLIVA  for follow-up care   on AS SCHEDULED/ AS NEEDED   .  Phone number: [phone]      SPECIAL INSTRUCTIONS:

## 2024-05-13 ENCOUNTER — TELEPHONE (OUTPATIENT)
Dept: SURGERY | Facility: CLINIC | Age: 59
End: 2024-05-13
Payer: MEDICARE

## 2024-05-14 ENCOUNTER — TELEPHONE (OUTPATIENT)
Dept: SURGERY | Facility: CLINIC | Age: 59
End: 2024-05-14
Payer: MEDICARE

## 2024-05-23 ENCOUNTER — OFFICE VISIT (OUTPATIENT)
Dept: SURGERY | Facility: CLINIC | Age: 59
End: 2024-05-23
Payer: MEDICARE

## 2024-05-23 VITALS
HEART RATE: 86 BPM | WEIGHT: 288.8 LBS | HEIGHT: 71 IN | SYSTOLIC BLOOD PRESSURE: 138 MMHG | DIASTOLIC BLOOD PRESSURE: 77 MMHG | BODY MASS INDEX: 40.43 KG/M2

## 2024-05-23 DIAGNOSIS — D36.10 NEUROMA: Primary | ICD-10-CM

## 2024-05-23 NOTE — LETTER
May 23, 2024       No Recipients    Patient: Augustine Zaragoza   YOB: 1965   Date of Visit: 5/23/2024     Dear Helene Hernandez MD:       Thank you for referring Augustine Zaragoza to me for evaluation. Below are the relevant portions of my assessment and plan of care.    If you have questions, please do not hesitate to call me. I look forward to following Augustine along with you.         Sincerely,        Uriah Ceja MD        CC:   No Recipients    Uriah Ceja MD  05/23/24 1758  Sign when Signing Visit  General Surgery/Colorectal Surgery   Post -op Follow up Note    Patient Name:  Augustine Zaragoza  YOB: 1965  [id number]    Referring Provider: No ref. provider found    Patient Care Team:  Helene Hernandez MD as PCP - General (Internal Medicine)  Uriah Ceja MD as Consulting Physician (General Surgery)  Charles, April, APRN as Nurse Practitioner (Nurse Practitioner)    Chief complaint follow-up    Subjective.     History of present illness:    Previously seen for Barker's esophagus    History of a small mass to his right anterior neck in February.  No history of the same.  No pain.  No infectious symptoms.  No fever chills night sweats.  No dysphagia.  No voice changes.  No painful swallowing.  No weight loss.  No changes in health or medications since last seen.  Ultrasound at outside facility of the neck with 4 mm hypoechoic right neck nodule questionable level 2 lymph node.  History of 3 neck surgeries with anterior exposure.    CBC with increased immature granulocyte, elevated  4/26/2024     father passed away from non-Hodgkin's lymphoma    Status post right anterior neck lymph node biopsy 5/10/2024 with pathology benign nerve tissue, neuroma    He comes in for follow-up.  No fever, erythema, pain.  He is pleased with the surgery.      History:  Past Medical History:   Diagnosis Date   • Allergies    • Anemia 04/15/2015    I’m on Ferous Sulfate tablets 2x a day   •  Anesthesia complication     ASPIRARATED - 1990'S WHEN COMING OUT OF PROCEDURE.   • Arthritis    • Asthma 09/11/1998    Sleep Apnea / I sleep with a BIPAP machine   • Jiménez's esophagus    • BPH (benign prostatic hyperplasia)    • Colon polyp    • Depression    • Essential tremor    • Hyperlipidemia    • Hypertension    • Sleep apnea     bipap       Past Surgical History:   Procedure Laterality Date   • APPENDECTOMY  1999   • BACK SURGERY     • CUBITAL TUNNEL RELEASE     • ENDOSCOPY N/A 11/14/2022    Procedure: ESOPHAGOGASTRODUODENOSCOPY WITH BX;  Surgeon: Uriah Ceja MD;  Location: [facility] ENDOSCOPY;  Service: General;  Laterality: N/A;  HISTORY OF BARRETTS WITH LOW GRADE DYSPLASIA   • ENDOSCOPY N/A 11/30/2023    Procedure: ESOPHAGOGASTRODUODENOSCOPY WITH COLD BIOPSIES;  Surgeon: Uriah Ceja MD;  Location: [facility] ENDOSCOPY;  Service: General;  Laterality: N/A;  JIMÉNEZ'S ESOPHAGUS   • FRACTURE SURGERY  Broken Nose 1992/1993   • KNEE ACL RECONSTRUCTION Bilateral    • KNEE ARTHROSCOPY      X3 RIGHT AND X3 LEFT   • LYMPH NODE BIOPSY N/A 05/10/2024    Procedure: BIOPSY LYMPH NODE neck;  Surgeon: Uriah Ceja MD;  Location: [facility] OR OSC;  Service: General;  Laterality: N/A;   • NECK SURGERY      x3   • TONSILLECTOMY AND ADENOIDECTOMY, UVULOPALATOPHARYNGOPLASTY     • ULNAR NERVE REPAIR         Family History   Problem Relation Age of Onset   • Cancer Father         Non Hodgkins Lymphoma   • COPD Maternal Grandmother    • Hypertension Maternal Grandmother    • Arthritis Paternal Grandmother    • Asthma Paternal Grandmother    • Diabetes Paternal Grandmother    • Kidney disease Mother    • Malig Hyperthermia Neg Hx        Social History     Tobacco Use   • Smoking status: Never     Passive exposure: Never   • Smokeless tobacco: Never   Vaping Use   • Vaping status: Never Used   Substance Use Topics   • Alcohol use: Yes     Alcohol/week: 2.0 standard drinks of alcohol     Types: 2 Cans of  beer per week     Comment: Only when watching sports on weekends; NFL, BARRETT, [employer],   • Drug use: Never       MEDS:  Prior to Admission medications    Medication Sig Start Date End Date Taking? Authorizing Provider   ascorbic acid (VITAMIN C) 1000 MG tablet Take 1 tablet by mouth Daily. 12/8/23  Yes Destini Moreno APRN   aspirin 81 MG EC tablet Take 1 tablet by mouth Daily. LAST DOSE 5/2/24   Yes Sandra Okeefe MD   buPROPion SR (WELLBUTRIN SR) 100 MG 12 hr tablet Take 1 tablet by mouth 2 (Two) Times a Day.   Yes Sandra Okeefe MD   carboxymethylcellulose (REFRESH PLUS) 0.5 % solution Refresh Tears 0.5 % eye drops   Yes Sandra Okeefe MD   cetirizine (zyrTEC) 10 MG tablet Take 1 tablet by mouth Daily.   Yes Sandra Okeefe MD   cyclobenzaprine (FLEXERIL) 10 MG tablet Take 1 tablet by mouth 2 (Two) Times a Day As Needed.   Yes Sandra Okeefe MD   ferrous sulfate 325 (65 FE) MG tablet Take 1 tablet by mouth 2 (Two) Times a Day.   Yes Sandra Okeefe MD   fluticasone (FLONASE) 50 MCG/ACT nasal spray fluticasone propionate 50 mcg/actuation nasal spray,suspension   Yes Sandra Okeefe MD   lisinopril (PRINIVIL,ZESTRIL) 20 MG tablet Take 1 tablet by mouth Daily.   Yes Sandra Okeefe MD   meloxicam (MOBIC) 15 MG tablet Take 1 tablet by mouth Daily.   Yes Sandra Okeefe MD   multivitamin with minerals tablet tablet Take 1 tablet by mouth Daily.   Yes Sandra Okeefe MD   Omega-3 Fatty Acids (fish oil) 1000 MG capsule capsule Take 1 capsule by mouth 2 (Two) Times a Day.   Yes Sandra Okeefe MD   omeprazole (priLOSEC) 20 MG capsule Take 1 capsule by mouth Daily.   Yes Sandra Okeefe MD   phentermine 30 MG capsule Take 1 capsule by mouth Every Morning. LAST DOSE 05/2/24   Yes Sandra Okeefe MD   propranolol (INDERAL) 40 MG tablet propranolol 40 mg tablet   Yes Sandra Okeefe MD   sildenafil (Viagra) 50 MG tablet Take 1 tablet by  mouth Every Night.   Yes Provider, MD Sandra   HYDROcodone-acetaminophen (Norco) 5-325 MG per tablet Take 1 tablet by mouth Every 6 (Six) Hours As Needed for Mild Pain. 5/10/24   Uriah Ceja MD   polyethylene glycol (MIRALAX) 17 g packet Take 17 g by mouth Daily. 5/10/24   Uriah Ceja MD             No current facility-administered medications for this visit.       Allergies:  Oxycodone-acetaminophen and Simvastatin    Objective    Vital Signs   Heart Rate:  [86] 86  BP: (138)/(77) 138/77    Physical Exam:     Incision without evidence of infection    Results Review: I have reviewed the patient's labs and imaging    LABS/IMAGING:    Imaging Results (Last 72 Hours)       ** No results found for the last 72 hours. **             Lab Results (last 72 hours)       ** No results found for the last 72 hours. **               Result Review:     Assessment & Plan    * No active hospital problems. *     Ultrasound at outside facility of the neck with 4 mm hypoechoic right neck nodule questionable level 2 lymph node.  History of 3 neck surgeries with anterior exposure.    CBC with increased immature granulocyte, elevated  4/26/2024     father passed away from non-Hodgkin's lymphoma    Status post right anterior neck lymph node biopsy 5/10/2024 with pathology benign nerve tissue, neuroma    I reviewed the pathology with the patient.  He has follow-up with oncology for further evaluation of his findings on his CBC and concern for lymphadenopathy.  Follow-up with me as needed.  Activity as tolerated.  All questions answered.  He agrees with the plan.  Thank for the consult.          Uriah Ceja MD  05/23/24 17:56 EDT

## 2024-05-23 NOTE — PROGRESS NOTES
General Surgery/Colorectal Surgery   Post -op Follow up Note    Patient Name:  Augustine Zaragoza  YOB: 1965  [id number]    Referring Provider: No ref. provider found    Patient Care Team:  Helene Hernandez MD as PCP - General (Internal Medicine)  Uriah Ceja MD as Consulting Physician (General Surgery)  Charles April, APRN as Nurse Practitioner (Nurse Practitioner)    Chief complaint follow-up    Subjective .     History of present illness:    Previously seen for Barker's esophagus    History of a small mass to his right anterior neck in February.  No history of the same.  No pain.  No infectious symptoms.  No fever chills night sweats.  No dysphagia.  No voice changes.  No painful swallowing.  No weight loss.  No changes in health or medications since last seen.  Ultrasound at outside facility of the neck with 4 mm hypoechoic right neck nodule questionable level 2 lymph node.  History of 3 neck surgeries with anterior exposure.    CBC with increased immature granulocyte, elevated  4/26/2024     father passed away from non-Hodgkin's lymphoma    Status post right anterior neck lymph node biopsy 5/10/2024 with pathology benign nerve tissue, neuroma    He comes in for follow-up.  No fever, erythema, pain.  He is pleased with the surgery.      History:  Past Medical History:   Diagnosis Date    Allergies     Anemia 04/15/2015    I’m on Ferous Sulfate tablets 2x a day    Anesthesia complication     ASPIRARATED - 1990'S WHEN COMING OUT OF PROCEDURE.    Arthritis     Asthma 09/11/1998    Sleep Apnea / I sleep with a BIPAP machine    Barker's esophagus     BPH (benign prostatic hyperplasia)     Colon polyp     Depression     Essential tremor     Hyperlipidemia     Hypertension     Sleep apnea     bipap       Past Surgical History:   Procedure Laterality Date    APPENDECTOMY  1999    BACK SURGERY      CUBITAL TUNNEL RELEASE      ENDOSCOPY N/A 11/14/2022    Procedure: ESOPHAGOGASTRODUODENOSCOPY  WITH BX;  Surgeon: Uriah Ceja MD;  Location: [facility] ENDOSCOPY;  Service: General;  Laterality: N/A;  HISTORY OF BARRETTS WITH LOW GRADE DYSPLASIA    ENDOSCOPY N/A 11/30/2023    Procedure: ESOPHAGOGASTRODUODENOSCOPY WITH COLD BIOPSIES;  Surgeon: Uriah Ceja MD;  Location: [facility] ENDOSCOPY;  Service: General;  Laterality: N/A;  JIMÉNEZ'S ESOPHAGUS    FRACTURE SURGERY  Broken Nose 1992/1993    KNEE ACL RECONSTRUCTION Bilateral     KNEE ARTHROSCOPY      X3 RIGHT AND X3 LEFT    LYMPH NODE BIOPSY N/A 05/10/2024    Procedure: BIOPSY LYMPH NODE neck;  Surgeon: Uriah Ceja MD;  Location: [facility] OR [facility];  Service: General;  Laterality: N/A;    NECK SURGERY      x3    TONSILLECTOMY AND ADENOIDECTOMY, UVULOPALATOPHARYNGOPLASTY      ULNAR NERVE REPAIR         Family History   Problem Relation Age of Onset    Cancer Father         Non Hodgkins Lymphoma    COPD Maternal Grandmother     Hypertension Maternal Grandmother     Arthritis Paternal Grandmother     Asthma Paternal Grandmother     Diabetes Paternal Grandmother     Kidney disease Mother     Malig Hyperthermia Neg Hx        Social History     Tobacco Use    Smoking status: Never     Passive exposure: Never    Smokeless tobacco: Never   Vaping Use    Vaping status: Never Used   Substance Use Topics    Alcohol use: Yes     Alcohol/week: 2.0 standard drinks of alcohol     Types: 2 Cans of beer per week     Comment: Only when watching sports on weekends; NFL, BARRETT, [employer],    Drug use: Never       MEDS:  Prior to Admission medications    Medication Sig Start Date End Date Taking? Authorizing Provider   ascorbic acid (VITAMIN C) 1000 MG tablet Take 1 tablet by mouth Daily. 12/8/23  Yes Destini Moreno APRN   aspirin 81 MG EC tablet Take 1 tablet by mouth Daily. LAST DOSE 5/2/24   Yes Sandra Okeefe MD   buPROPion SR (WELLBUTRIN SR) 100 MG 12 hr tablet Take 1 tablet by mouth 2 (Two) Times a Day.   Yes Sandra Okeefe MD    carboxymethylcellulose (REFRESH PLUS) 0.5 % solution Refresh Tears 0.5 % eye drops   Yes Sandra Okeefe MD   cetirizine (zyrTEC) 10 MG tablet Take 1 tablet by mouth Daily.   Yes Sandra Okeefe MD   cyclobenzaprine (FLEXERIL) 10 MG tablet Take 1 tablet by mouth 2 (Two) Times a Day As Needed.   Yes Sandra Okeefe MD   ferrous sulfate 325 (65 FE) MG tablet Take 1 tablet by mouth 2 (Two) Times a Day.   Yes Sandra Okeefe MD   fluticasone (FLONASE) 50 MCG/ACT nasal spray fluticasone propionate 50 mcg/actuation nasal spray,suspension   Yes Sandra Okeefe MD   lisinopril (PRINIVIL,ZESTRIL) 20 MG tablet Take 1 tablet by mouth Daily.   Yes Sandra Okeefe MD   meloxicam (MOBIC) 15 MG tablet Take 1 tablet by mouth Daily.   Yes Sandra Okeefe MD   multivitamin with minerals tablet tablet Take 1 tablet by mouth Daily.   Yes Sandra Okeefe MD   Omega-3 Fatty Acids (fish oil) 1000 MG capsule capsule Take 1 capsule by mouth 2 (Two) Times a Day.   Yes Sandra Okeefe MD   omeprazole (priLOSEC) 20 MG capsule Take 1 capsule by mouth Daily.   Yes Sandra Okeefe MD   phentermine 30 MG capsule Take 1 capsule by mouth Every Morning. LAST DOSE 05/2/24   Yes Sandra Okeefe MD   propranolol (INDERAL) 40 MG tablet propranolol 40 mg tablet   Yes Sandra Okeefe MD   sildenafil (Viagra) 50 MG tablet Take 1 tablet by mouth Every Night.   Yes Sandra Okeefe MD   HYDROcodone-acetaminophen (Norco) 5-325 MG per tablet Take 1 tablet by mouth Every 6 (Six) Hours As Needed for Mild Pain. 5/10/24   Uriah Ceja MD   polyethylene glycol (MIRALAX) 17 g packet Take 17 g by mouth Daily. 5/10/24   Uriah Ceja MD             No current facility-administered medications for this visit.       Allergies:  Oxycodone-acetaminophen and Simvastatin    Objective     Vital Signs   Heart Rate:  [86] 86  BP: (138)/(77) 138/77    Physical Exam:     Incision  without evidence of infection    Results Review: I have reviewed the patient's labs and imaging    LABS/IMAGING:    Imaging Results (Last 72 Hours)       ** No results found for the last 72 hours. **             Lab Results (last 72 hours)       ** No results found for the last 72 hours. **               Result Review :     Assessment & Plan     * No active hospital problems. *     Ultrasound at outside facility of the neck with 4 mm hypoechoic right neck nodule questionable level 2 lymph node.  History of 3 neck surgeries with anterior exposure.    CBC with increased immature granulocyte, elevated  4/26/2024     father passed away from non-Hodgkin's lymphoma    Status post right anterior neck lymph node biopsy 5/10/2024 with pathology benign nerve tissue, neuroma    I reviewed the pathology with the patient.  He has follow-up with oncology for further evaluation of his findings on his CBC and concern for lymphadenopathy.  Follow-up with me as needed.  Activity as tolerated.  All questions answered.  He agrees with the plan.  Thank for the consult.          Uriah Ceja MD  05/23/24 17:56 EDT

## 2024-05-31 ENCOUNTER — LAB (OUTPATIENT)
Dept: ONCOLOGY | Facility: HOSPITAL | Age: 59
End: 2024-05-31
Payer: MEDICARE

## 2024-05-31 ENCOUNTER — CONSULT (OUTPATIENT)
Dept: ONCOLOGY | Facility: HOSPITAL | Age: 59
End: 2024-05-31
Payer: MEDICARE

## 2024-05-31 VITALS
TEMPERATURE: 97.8 F | SYSTOLIC BLOOD PRESSURE: 97 MMHG | DIASTOLIC BLOOD PRESSURE: 68 MMHG | HEART RATE: 86 BPM | HEIGHT: 71 IN | BODY MASS INDEX: 40.43 KG/M2 | OXYGEN SATURATION: 96 % | WEIGHT: 288.8 LBS

## 2024-05-31 DIAGNOSIS — R79.89 ABNORMAL CBC: Primary | ICD-10-CM

## 2024-05-31 DIAGNOSIS — D64.9 ANEMIA, UNSPECIFIED TYPE: Primary | ICD-10-CM

## 2024-05-31 DIAGNOSIS — R59.1 LYMPHADENOPATHY: ICD-10-CM

## 2024-05-31 LAB
ANISOCYTOSIS BLD QL: ABNORMAL
DEPRECATED RDW RBC AUTO: 45.8 FL (ref 37–54)
EOSINOPHIL # BLD MANUAL: 0.58 10*3/MM3 (ref 0–0.4)
EOSINOPHIL NFR BLD MANUAL: 6 % (ref 0.3–6.2)
ERYTHROCYTE [DISTWIDTH] IN BLOOD BY AUTOMATED COUNT: 14.1 % (ref 12.3–15.4)
HCT VFR BLD AUTO: 45 % (ref 37.5–51)
HGB BLD-MCNC: 14.8 G/DL (ref 13–17.7)
LYMPHOCYTES # BLD MANUAL: 1.93 10*3/MM3 (ref 0.7–3.1)
LYMPHOCYTES NFR BLD MANUAL: 2 % (ref 5–12)
MCH RBC QN AUTO: 29.2 PG (ref 26.6–33)
MCHC RBC AUTO-ENTMCNC: 32.9 G/DL (ref 31.5–35.7)
MCV RBC AUTO: 88.9 FL (ref 79–97)
MONOCYTES # BLD: 0.19 10*3/MM3 (ref 0.1–0.9)
NEUTROPHILS # BLD AUTO: 6.95 10*3/MM3 (ref 1.7–7)
NEUTROPHILS NFR BLD MANUAL: 72 % (ref 42.7–76)
PATHOLOGY REVIEW: YES
PLATELET # BLD AUTO: 306 10*3/MM3 (ref 140–450)
PMV BLD AUTO: 10 FL (ref 6–12)
RBC # BLD AUTO: 5.06 10*6/MM3 (ref 4.14–5.8)
SMALL PLATELETS BLD QL SMEAR: ADEQUATE
VARIANT LYMPHS NFR BLD MANUAL: 20 % (ref 19.6–45.3)
WBC MORPH BLD: NORMAL
WBC NRBC COR # BLD AUTO: 9.65 10*3/MM3 (ref 3.4–10.8)

## 2024-05-31 PROCEDURE — 85025 COMPLETE CBC W/AUTO DIFF WBC: CPT | Performed by: NURSE PRACTITIONER

## 2024-05-31 PROCEDURE — 36415 COLL VENOUS BLD VENIPUNCTURE: CPT | Performed by: NURSE PRACTITIONER

## 2024-05-31 RX ORDER — TADALAFIL 5 MG/1
5 TABLET ORAL NIGHTLY
COMMUNITY

## 2024-05-31 NOTE — PROGRESS NOTES
Chief Complaint/Reason for Referral:  Swollen Glands (Right clavicular lymph node, has been excised )    Helene Hernandez MD Mandzy, Lana J, MD    Records Obtained:  Records of the patients history including those obtained from  [facility] and patient information were reviewed and summarized in detail.    Subjective    History of Present Illness    Mr. Augustine Zaragoza is a very pleasant 59 year old  male presenting for new patient evaluation. Reports noticed an enlarged lymph node on the right side in his clavicle area when he shaved. [facility] and were planning to follow up with repeat US. Patient did not feel comfortable with this plan of care. So he sought out a second opinion with Dr. Ceja and the LN was excised on  5/10/2024 and biopsy results were noted a benign nerve tissue, consisent with a neuroma.     Lab work with CBC on 4/26/2024 did show normal CBC with WBC of 8.07, hemoglobin normal at 15.3, and platelet count of 262. Differential did show absolute immature granulocytosis of 0.08. Normal is 0.00 to 0.05.     Denies any fevers, chills or lymphadenopathy or weight loss.     Other PMH includes: Barker's esophagus, GERD.     Father with history of NHL and has since passed.       Oncology/Hematology History    No history exists.       Review of Systems   Constitutional: Negative.    HENT: Negative.     Eyes: Negative.    Respiratory: Negative.     Cardiovascular: Negative.    Gastrointestinal: Negative.    Endocrine: Negative.    Genitourinary: Negative.    Musculoskeletal: Negative.    Skin: Negative.    Allergic/Immunologic: Negative.    Neurological: Negative.    Hematological: Negative.    Psychiatric/Behavioral: Negative.         Current Outpatient Medications on File Prior to Visit   Medication Sig Dispense Refill    ascorbic acid (VITAMIN C) 1000 MG tablet Take 1 tablet by mouth Daily. 10 tablet 0    aspirin 81 MG EC tablet Take 1 tablet by mouth Daily. LAST DOSE 5/2/24       buPROPion SR (WELLBUTRIN SR) 100 MG 12 hr tablet Take 1 tablet by mouth 2 (Two) Times a Day.      carboxymethylcellulose (REFRESH PLUS) 0.5 % solution Refresh Tears 0.5 % eye drops      cetirizine (zyrTEC) 10 MG tablet Take 1 tablet by mouth Daily.      cyclobenzaprine (FLEXERIL) 10 MG tablet Take 1 tablet by mouth 2 (Two) Times a Day As Needed.      ferrous sulfate 325 (65 FE) MG tablet Take 1 tablet by mouth 2 (Two) Times a Day.      fluticasone (FLONASE) 50 MCG/ACT nasal spray fluticasone propionate 50 mcg/actuation nasal spray,suspension      lisinopril (PRINIVIL,ZESTRIL) 20 MG tablet Take 1 tablet by mouth Daily.      meloxicam (MOBIC) 15 MG tablet Take 1 tablet by mouth Daily.      multivitamin with minerals tablet tablet Take 1 tablet by mouth Daily.      Omega-3 Fatty Acids (fish oil) 1000 MG capsule capsule Take 1 capsule by mouth 2 (Two) Times a Day.      omeprazole (priLOSEC) 20 MG capsule Take 1 capsule by mouth Daily.      phentermine 30 MG capsule Take 1 capsule by mouth Every Morning. LAST DOSE 05/2/24      propranolol (INDERAL) 40 MG tablet propranolol 40 mg tablet      sildenafil (Viagra) 50 MG tablet Take 1 tablet by mouth Every Night.      tadalafil (CIALIS) 5 MG tablet Take 1 tablet by mouth Every Night.      HYDROcodone-acetaminophen (Norco) 5-325 MG per tablet Take 1 tablet by mouth Every 6 (Six) Hours As Needed for Mild Pain. 6 tablet 0    polyethylene glycol (MIRALAX) 17 g packet Take 17 g by mouth Daily. 5 packet 0     No current facility-administered medications on file prior to visit.       Allergies   Allergen Reactions    Oxycodone-Acetaminophen Swelling    Simvastatin Myalgia     Muscle pain     Past Medical History:   Diagnosis Date    Allergies     Anemia 04/15/2015    I’m on Ferous Sulfate tablets 2x a day    Anesthesia complication     ASPIRARATED - 1990'S WHEN COMING OUT OF PROCEDURE.    Arthritis     Asthma 09/11/1998    Sleep Apnea / I sleep with a BIPAP machine    Nandini  esophagus     BPH (benign prostatic hyperplasia)     Colon polyp     Depression     Essential tremor     Hyperlipidemia     Hypertension     Sleep apnea     bipap     Past Surgical History:   Procedure Laterality Date    APPENDECTOMY  1999    BACK SURGERY      CUBITAL TUNNEL RELEASE      ENDOSCOPY N/A 11/14/2022    Procedure: ESOPHAGOGASTRODUODENOSCOPY WITH BX;  Surgeon: Uriah Ceja MD;  Location: [facility] ENDOSCOPY;  Service: General;  Laterality: N/A;  HISTORY OF BARRETTS WITH LOW GRADE DYSPLASIA    ENDOSCOPY N/A 11/30/2023    Procedure: ESOPHAGOGASTRODUODENOSCOPY WITH COLD BIOPSIES;  Surgeon: Uriah Ceja MD;  Location: [facility] ENDOSCOPY;  Service: General;  Laterality: N/A;  JIMÉNEZ'S ESOPHAGUS    FRACTURE SURGERY  Broken Nose 1992/1993    KNEE ACL RECONSTRUCTION Bilateral     KNEE ARTHROSCOPY      X3 RIGHT AND X3 LEFT    LYMPH NODE BIOPSY N/A 05/10/2024    Procedure: BIOPSY LYMPH NODE neck;  Surgeon: Uriah Ceja MD;  Location: [facility] OR [facility];  Service: General;  Laterality: N/A;    NECK SURGERY      x3    TONSILLECTOMY AND ADENOIDECTOMY, UVULOPALATOPHARYNGOPLASTY      ULNAR NERVE REPAIR       Social History     Socioeconomic History    Marital status:    Tobacco Use    Smoking status: Never     Passive exposure: Never    Smokeless tobacco: Never   Vaping Use    Vaping status: Never Used   Substance and Sexual Activity    Alcohol use: Yes     Alcohol/week: 2.0 standard drinks of alcohol     Types: 2 Cans of beer per week     Comment: Only when watching sports on weekends; NFL, BARRETT, [employer],    Drug use: Never    Sexual activity: Yes     Partners: Female     Birth control/protection: Vasectomy     Comment: Had vasectomy 1999     Family History   Problem Relation Age of Onset    Cancer Father         Non Hodgkins Lymphoma    COPD Maternal Grandmother     Hypertension Maternal Grandmother     Arthritis Paternal Grandmother     Asthma Paternal Grandmother     Diabetes Paternal  "Grandmother     Kidney disease Mother     Malricardo Hyperthermia Neg Hx      Immunization History   Administered Date(s) Administered    COVID-19 (MODERNA) BIVALENT 12+YRS 10/04/2022    COVID-19 (MODERNA) Monovalent Original Booster 11/30/2021, 04/04/2022    COVID-19 F23 (PFIZER) 12YRS+ (COMIRNATY) 09/20/2023       Tobacco Use: Low Risk  (5/23/2024)    Patient History     Smoking Tobacco Use: Never     Smokeless Tobacco Use: Never     Passive Exposure: Never       Objective     Physical Exam  Vitals and nursing note reviewed.   Constitutional:       Appearance: Normal appearance. He is obese.   HENT:      Head: Normocephalic.      Nose: Nose normal.      Mouth/Throat:      Mouth: Mucous membranes are moist.   Eyes:      Pupils: Pupils are equal, round, and reactive to light.   Cardiovascular:      Rate and Rhythm: Normal rate and regular rhythm.      Pulses: Normal pulses.      Heart sounds: Normal heart sounds.   Pulmonary:      Effort: Pulmonary effort is normal.   Abdominal:      General: Bowel sounds are normal.      Palpations: Abdomen is soft.   Musculoskeletal:         General: Normal range of motion.      Cervical back: Normal range of motion and neck supple.   Skin:     General: Skin is warm and dry.      Capillary Refill: Capillary refill takes less than 2 seconds.   Neurological:      General: No focal deficit present.      Mental Status: He is alert and oriented to person, place, and time.   Psychiatric:         Mood and Affect: Mood normal.         Behavior: Behavior normal.         Thought Content: Thought content normal.         Judgment: Judgment normal.         Vitals:    05/31/24 1412   BP: 97/68   Pulse: 86   Temp: 97.8 °F (36.6 °C)   SpO2: 96%   Weight: 131 kg (288 lb 12.8 oz)   Height: 180.3 cm (71\")   PainSc: 0-No pain       Wt Readings from Last 3 Encounters:   05/31/24 131 kg (288 lb 12.8 oz)   05/23/24 131 kg (288 lb 12.8 oz)   05/10/24 130 kg (286 lb 6 oz)                 ECOG score: 0     " "    ECOG: (0) Fully Active - Able to Carry On All Pre-disease Performance Without Restriction  Fall Risk Assessment was completed, and patient is at low risk for falls.  PHQ-9 Total Score: 0       The patient is not  experiencing fatigue. Fatigue score: 0    PT/OT Functional Screening: PT fx screen : No needs identified  Speech Functional Screening: Speech fx screen : No needs identified  Rehab to be ordered: Rehab to be ordered : No needs identified        Result Review :  The following data was reviewed by: MEL Hannah on 05/31/2024:  Lab Results   Component Value Date    HGB 15.3 04/26/2024    HCT 46.4 04/26/2024    MCV 86.7 04/26/2024     04/26/2024    WBC 8.07 04/26/2024    NEUTROABS 5.03 04/26/2024    LYMPHSABS 1.80 04/26/2024    MONOSABS 0.71 04/26/2024    EOSABS 0.37 04/26/2024    BASOSABS 0.08 04/26/2024     No results found for: \"GLUCOSE\", \"BUN\", \"CREATININE\", \"NA\", \"K\", \"CL\", \"CO2\", \"CALCIUM\", \"PROTEINTOT\", \"ALBUMIN\", \"BILITOT\", \"ALKPHOS\", \"AST\", \"ALT\"  Lab Results   Component Value Date     (H) 04/26/2024     No results found for: \"IRON\", \"LABIRON\", \"TRANSFERRIN\", \"TIBC\"  Lab Results   Component Value Date     (H) 04/26/2024     No results found for: \"PSA\", \"CEA\", \"AFP\", \"\", \"\"    No Images in the past 120 days found..         Assessment and Plan:  Diagnoses and all orders for this visit:    1. Abnormal CBC (Primary)  -     CBC & Differential  -     Peripheral Blood Smear    2. Lymphadenopathy    Abnormal CBC on the differential with an increase in the absolute granulocytes. NO fevers, chills, signs or symptoms of infection. Will repeat CBC, peripheral smear today.     If normal, can follow back up with his PCP. If abnormal, then will see in 6 weeks.     Lymphadenopathy: biopsy completed and was benign neuroma. No other follow up is needed.         Patient Follow Up: 6 weeks with NP.     Patient was given instructions and counseling regarding his condition " or for health maintenance advice. Please see specific information pulled into the AVS if appropriate.     Mariam Feliciano, APRN    5/31/2024    .tob

## 2024-08-05 ENCOUNTER — HOSPITAL ENCOUNTER (OUTPATIENT)
Dept: ULTRASOUND IMAGING | Facility: HOSPITAL | Age: 59
Discharge: HOME | End: 2024-08-05
Admitting: SURGERY
Payer: MEDICARE

## 2024-08-05 DIAGNOSIS — R22.1 NECK MASS: ICD-10-CM

## 2024-08-05 PROCEDURE — 76536 US EXAM OF HEAD AND NECK: CPT

## 2024-08-06 ENCOUNTER — TELEPHONE (OUTPATIENT)
Dept: SURGERY | Facility: CLINIC | Age: 59
End: 2024-08-06
Payer: MEDICARE

## 2024-08-06 NOTE — TELEPHONE ENCOUNTER
----- Message from Uriah Ceja sent at 8/6/2024  7:38 AM EDT -----  Call patient.  Negative ultrasound.    Benign focused ultrasound examination of the right neck  ----- Message -----  From: Interface, Rad Results [place] In  Sent: 8/5/2024   3:10 PM EDT  To: Uriah Ceja MD

## 2024-08-06 NOTE — TELEPHONE ENCOUNTER
[facility]     CALLED TO GIVE PATIENT RESULTS FROM US NECK    DR OLIVA STATES:     Negative ultrasound.     Benign focused ultrasound examination of the right neck

## 2024-09-11 NOTE — INTERVAL H&P NOTE
H&P reviewed. The patient was examined and there are no changes to the H&P.         Place of Service: [facility]    Patient: Carlos Sarmiento    MRN: [id number]    Date of procedure: 9/11/2024    Surgeon: Hai Benavides MD    Primary Physician: Franci Mahajan MD    Events:   Event Tracking       Panel 1       Procedure : (OA)COLONOSCOPY with polypectomy        Event Time In    Procedure Start 0941    Procedure End 0949               Scope In: 0941    At Cecum: 0942   Scope Out: 0949  Cecal withdrawal time:    Scope Withdrawal Time: 7.02 Min                PROCEDURE : Colonoscopy screening    Preoperative Diagnosis: Screening Colonoscopy no prior colonoscopy.    Postoperative Diagnosis : see impression    Anesthesia Administered: as per Anesthesia    ESTIMATED BLOOD LOSS: <10 ml    I have discussed the risks, benefits, and alternatives to colonoscopy with the patient [who demonstrated understanding], including but not limited to the risks of bleeding, infection, pain, death, as well as the risks of anesthesia and perforation all leading to prolonged hospitalization, surgical intervention, or even death. I also specifically mentioned the miss rate of colonoscopy of 5-10% in the best of all circumstances. All questions were answered to the patient’s satisfaction. The patient elected to proceed with colonoscopy with intervention [such as, polypectomy, stent placement, etc.] as indicated.    Procedure Description: The patient was placed in the left lateral position and monitored continuously through ECG tracing, pulse oximetry monitoring and direct observations. Medications were administered incrementally over the course of the procedure to achieve an adequate level of conscious sedation. After anorectal examination was performed, the Olympus ACF-190AL was inserted into the rectum and advanced under direct vision to the cecum, which was identified by IC valve and appendiceal orifice. The procedure was considered not difficult..      During withdrawal examination, the final  quality of the prep was Munger bowel prep scale Right colon: .3- (entire mucosa of colon segment seen well, with no residual staining, small fragments of stool, or opaque liquid)  Transverse: 3- (entire mucosa of colon segment seen well, with no residual staining, small fragments of stool, or opaque liquid)  Left colon:3- (entire mucosa of colon segment seen well, with no residual staining, small fragments of stool, or opaque liquid)    A careful inspection was made as the colonoscope was withdrawn, including a retroflexed view of the rectum, findings and interventions are described below. Appropriate photo documentation was obtained.      Overall Carlos Sarmiento tolerated the procedure well, without undue discomfort, hypotension or desaturation. The patient was adequately recovered in the endoscopy suite and was transported to the Post Anesthesia Care Unit.        Findings:  Anorectal: Internal hemorrhoids  Terminal ileum: Not examined  Cecum: Normal mucosa with preserve vascularity, absence of ulcers,erythema, or friability  Ascending colon: Normal mucosa with preserve vascularity, absence of ulcers,erythema, or friability  Transverse colon: 1 Polyps size ranging from 6 mm removed by cold snare polypectomy   Descending colon: 1 Polyps size ranging from 3 mm removed by cold biopsy polypectomy   Sigmoid colon: Normal mucosa with preserve vascularity, absence of ulcers,erythema, or friability  Rectum: Normal mucosa with preserve vascularity, absence of ulcers,erythema, or friability    Specimens:  See Findings for Specimens      Complications: none    Implants: None    Impression:  Polyp(s)  and hemorroids      Recommendations:  Awaiting pathology results due to biopsy(s) performed., If polyp is adenomatous, patient will need a repeat colonoscopy in 5 year(s)., and Start benifiber daily.    Please report any post procedure adverse events, complications, or infections to our office.

## 2025-01-28 ENCOUNTER — LAB (OUTPATIENT)
Dept: LAB | Facility: HOSPITAL | Age: 60
End: 2025-01-28
Payer: MEDICARE

## 2025-01-28 ENCOUNTER — TRANSCRIBE ORDERS (OUTPATIENT)
Dept: ADMINISTRATIVE | Facility: HOSPITAL | Age: 60
End: 2025-01-28
Payer: MEDICARE

## 2025-01-28 DIAGNOSIS — G72.9 MYOPATHY, UNSPECIFIED: ICD-10-CM

## 2025-01-28 DIAGNOSIS — G72.9 MYOPATHY, UNSPECIFIED: Primary | ICD-10-CM

## 2025-01-28 PROCEDURE — 86235 NUCLEAR ANTIGEN ANTIBODY: CPT

## 2025-01-28 PROCEDURE — 83516 IMMUNOASSAY NONANTIBODY: CPT

## 2025-01-28 PROCEDURE — 36415 COLL VENOUS BLD VENIPUNCTURE: CPT

## 2025-02-18 LAB
EJ AB SER QL: NEGATIVE
ENA JO1 AB SER IA-ACNC: <20 UNITS
ENA PM/SCL AB SER-ACNC: <20 UNITS
ENA SS-A 52KD IGG SER IA-ACNC: <20 UNITS
FIBRILLARIN AB SER QL: NEGATIVE
KU AB SER QL: NEGATIVE
MDA5 AB SER LINE BLOT-ACNC: <20 UNITS
MI2 AB SER QL: NEGATIVE
MJ AB SER LINE BLOT-ACNC: <20 UNITS
OJ AB SER QL: NEGATIVE
PL12 AB SER QL: NEGATIVE
PL7 AB SER QL: NEGATIVE
SAE1 IGG SER QL LINE BLOT: <20 UNITS
SRP AB SERPL QL: NEGATIVE
TIF1-GAMMA AB SER IA-ACNC: <20 UNITS
U1 SNRNP AB SER IA-ACNC: <20 UNITS
U2 SNRNP AB SER QL: NEGATIVE

## 2025-05-16 ENCOUNTER — TRANSCRIBE ORDERS (OUTPATIENT)
Dept: LAB | Facility: HOSPITAL | Age: 60
End: 2025-05-16
Payer: MEDICARE

## 2025-05-16 ENCOUNTER — LAB (OUTPATIENT)
Dept: LAB | Facility: HOSPITAL | Age: 60
End: 2025-05-16
Payer: MEDICARE

## 2025-05-16 DIAGNOSIS — G62.9 PERIPHERAL NERVE DISORDER: Primary | ICD-10-CM

## 2025-05-16 DIAGNOSIS — G62.9 PERIPHERAL NERVE DISORDER: ICD-10-CM

## 2025-05-16 PROCEDURE — 83516 IMMUNOASSAY NONANTIBODY: CPT

## 2025-05-16 PROCEDURE — 36415 COLL VENOUS BLD VENIPUNCTURE: CPT

## 2025-05-16 PROCEDURE — 86255 FLUORESCENT ANTIBODY SCREEN: CPT

## (undated) DEVICE — SINGLE-USE BIOPSY FORCEPS: Brand: RADIAL JAW 4

## (undated) DEVICE — BLCK/BITE BLOX WO/DENTL/RIM W/STRAP 54F

## (undated) DEVICE — STERILE POLYISOPRENE POWDER-FREE SURGICAL GLOVES: Brand: PROTEXIS

## (undated) DEVICE — STERILE POLYISOPRENE POWDER-FREE SURGICAL GLOVES WITH EMOLLIENT COATING: Brand: PROTEXIS

## (undated) DEVICE — Device: Brand: DEFENDO AIR/WATER/SUCTION AND BIOPSY VALVE

## (undated) DEVICE — SOLIDIFIER LIQLOC PLS 1500CC BT

## (undated) DEVICE — Device

## (undated) DEVICE — SOL IRRG H2O PL/BG 1000ML STRL

## (undated) DEVICE — LINER SURG CANSTR SXN S/RIGD 1500CC

## (undated) DEVICE — SLV SCD KN/LEN ADJ EXPRSS BLENDED MD 1P/U

## (undated) DEVICE — INTENDED FOR TISSUE SEPARATION, AND OTHER PROCEDURES THAT REQUIRE A SHARP SURGICAL BLADE TO PUNCTURE OR CUT.: Brand: BARD-PARKER ® CARBON RIB-BACK BLADES

## (undated) DEVICE — GLOVE,SURG,SENSICARE SLT,LF,PF,6.5: Brand: MEDLINE

## (undated) DEVICE — CONN JET HYDRA H20 AUXILIARY DISP

## (undated) DEVICE — GLV SURG SENSICARE PI LF PF 7.5 GRN STRL

## (undated) DEVICE — GOWN,REINFRCE,POLY,SIRUS,BREATH SLV,XXLG: Brand: MEDLINE

## (undated) DEVICE — SOL IRR H2O BTL 1000ML STRL

## (undated) DEVICE — ELECTRD BLD MEGADYNE EZCLEAN STD 2.75IN XLNG

## (undated) DEVICE — MAJOR-LF: Brand: MEDLINE INDUSTRIES, INC.

## (undated) DEVICE — TBG PENCL TELESCP MEGADYNE SMOKE EVAC 10FT

## (undated) DEVICE — GLOVE,SURG,SENSICARE SLT,LF,PF,7.5: Brand: MEDLINE

## (undated) DEVICE — ANTIBACTERIAL UNDYED BRAIDED (POLYGLACTIN 910), SYNTHETIC ABSORBABLE SUTURE: Brand: COATED VICRYL

## (undated) DEVICE — ADHS SKIN PREMIERPRO EXOFIN TOPICAL HI/VISC .5ML

## (undated) DEVICE — ANTIBACTERIAL VIOLET BRAIDED (POLYGLACTIN 910), SYNTHETIC ABSORBABLE SUTURE: Brand: COATED VICRYL